# Patient Record
Sex: FEMALE | Race: WHITE | NOT HISPANIC OR LATINO | Employment: OTHER | URBAN - METROPOLITAN AREA
[De-identification: names, ages, dates, MRNs, and addresses within clinical notes are randomized per-mention and may not be internally consistent; named-entity substitution may affect disease eponyms.]

---

## 2017-04-19 DIAGNOSIS — L40.9 PSORIASIS OF SCALP: ICD-10-CM

## 2017-04-19 RX ORDER — CLOBETASOL PROPIONATE 0.5 MG/ML
SOLUTION TOPICAL
Qty: 50 ML | Refills: 0 | Status: CANCELLED | OUTPATIENT
Start: 2017-04-19

## 2017-04-19 NOTE — TELEPHONE ENCOUNTER
Clobetasol      Last Written Prescription Date: 10/31/16  Last Fill Quantity: 50,  # refills: 0   Last Office Visit with [facility], [facility] or [facility] prescribing provider: 12/02/16

## 2017-04-21 NOTE — TELEPHONE ENCOUNTER
clobetasol (TEMOVATE) 0.05 % external solution 50 mL 0 10/31/2016  No   Sig: Apply sparingly to affected area twice daily for 14 days.  Do not apply to face.      Last Office Visit with [facility], [facility] or [facility] prescribing provider: Dr. Agbeh on 12-02-16 for WWE.  Routing refill request to provider for review/approval because:  No plan noted for Rx which is a requirement for RN to RF per [facility] protocols.   Will route to Dr. Agbeh for review & orders. Victorina Hobson RN, BAN

## 2017-05-01 ENCOUNTER — MYC REFILL (OUTPATIENT)
Dept: OBGYN | Facility: CLINIC | Age: 37
End: 2017-05-01

## 2017-05-01 DIAGNOSIS — L40.9 PSORIASIS OF SCALP: ICD-10-CM

## 2017-05-04 RX ORDER — CLOBETASOL PROPIONATE 0.5 MG/ML
SOLUTION TOPICAL
Qty: 50 ML | Refills: 0 | Status: SHIPPED | OUTPATIENT
Start: 2017-05-04 | End: 2018-10-01

## 2017-05-04 NOTE — TELEPHONE ENCOUNTER
Message from better.:  Original authorizing provider: Cephas Mawuena Agbeh, MD Allison Jeziorski would like a refill of the following medications:  clobetasol (TEMOVATE) 0.05 % external solution [Cephas Mawuena Agbeh, MD]    Preferred pharmacy: [id number] - [place], [place] - [address]    Comment:  I requested a refill and my pharmacy ([facility]) said it was denied and that I needed to go back to my prescriber. Please advise if you can submit refills with my pharmacy. Thanks, Kenzie Davila

## 2017-10-26 ENCOUNTER — ALLIED HEALTH/NURSE VISIT (OUTPATIENT)
Dept: NURSING | Facility: CLINIC | Age: 37
End: 2017-10-26
Payer: COMMERCIAL

## 2017-10-26 DIAGNOSIS — Z23 NEED FOR PROPHYLACTIC VACCINATION AND INOCULATION AGAINST INFLUENZA: Primary | ICD-10-CM

## 2017-10-26 PROCEDURE — 99207 ZZC NO CHARGE NURSE ONLY: CPT

## 2017-10-26 PROCEDURE — 90471 IMMUNIZATION ADMIN: CPT

## 2017-10-26 PROCEDURE — 90686 IIV4 VACC NO PRSV 0.5 ML IM: CPT

## 2017-10-26 NOTE — PROGRESS NOTES

## 2017-12-21 ENCOUNTER — OFFICE VISIT (OUTPATIENT)
Dept: OBGYN | Facility: CLINIC | Age: 37
End: 2017-12-21
Payer: COMMERCIAL

## 2017-12-21 VITALS
HEIGHT: 63 IN | WEIGHT: 127.4 LBS | DIASTOLIC BLOOD PRESSURE: 79 MMHG | OXYGEN SATURATION: 100 % | SYSTOLIC BLOOD PRESSURE: 131 MMHG | BODY MASS INDEX: 22.57 KG/M2 | HEART RATE: 76 BPM

## 2017-12-21 DIAGNOSIS — N92.0 MENORRHAGIA WITH REGULAR CYCLE: ICD-10-CM

## 2017-12-21 DIAGNOSIS — Z01.411 ENCOUNTER FOR GYNECOLOGICAL EXAMINATION WITH ABNORMAL FINDING: Primary | ICD-10-CM

## 2017-12-21 LAB
ALBUMIN SERPL-MCNC: 4.4 G/DL (ref 3.4–5)
ALP SERPL-CCNC: 44 U/L (ref 40–150)
ALT SERPL W P-5'-P-CCNC: 28 U/L (ref 0–50)
ANION GAP SERPL CALCULATED.3IONS-SCNC: 7 MMOL/L (ref 3–14)
AST SERPL W P-5'-P-CCNC: 23 U/L (ref 0–45)
BILIRUB SERPL-MCNC: 0.6 MG/DL (ref 0.2–1.3)
BUN SERPL-MCNC: 14 MG/DL (ref 7–30)
CALCIUM SERPL-MCNC: 8.9 MG/DL (ref 8.5–10.1)
CHLORIDE SERPL-SCNC: 105 MMOL/L (ref 94–109)
CO2 SERPL-SCNC: 27 MMOL/L (ref 20–32)
CREAT SERPL-MCNC: 0.71 MG/DL (ref 0.52–1.04)
ERYTHROCYTE [DISTWIDTH] IN BLOOD BY AUTOMATED COUNT: 12.3 % (ref 10–15)
GFR SERPL CREATININE-BSD FRML MDRD: >90 ML/MIN/1.7M2
GLUCOSE SERPL-MCNC: 90 MG/DL (ref 70–99)
HCT VFR BLD AUTO: 37.8 % (ref 35–47)
HGB BLD-MCNC: 12.6 G/DL (ref 11.7–15.7)
MCH RBC QN AUTO: 31 PG (ref 26.5–33)
MCHC RBC AUTO-ENTMCNC: 33.3 G/DL (ref 31.5–36.5)
MCV RBC AUTO: 93 FL (ref 78–100)
PLATELET # BLD AUTO: 270 10E9/L (ref 150–450)
POTASSIUM SERPL-SCNC: 3.8 MMOL/L (ref 3.4–5.3)
PROT SERPL-MCNC: 8.2 G/DL (ref 6.8–8.8)
RBC # BLD AUTO: 4.07 10E12/L (ref 3.8–5.2)
SODIUM SERPL-SCNC: 139 MMOL/L (ref 133–144)
TSH SERPL DL<=0.005 MIU/L-ACNC: 1.4 MU/L (ref 0.4–4)
WBC # BLD AUTO: 4 10E9/L (ref 4–11)

## 2017-12-21 PROCEDURE — 99395 PREV VISIT EST AGE 18-39: CPT | Performed by: OBSTETRICS & GYNECOLOGY

## 2017-12-21 PROCEDURE — 85027 COMPLETE CBC AUTOMATED: CPT | Performed by: OBSTETRICS & GYNECOLOGY

## 2017-12-21 PROCEDURE — 80053 COMPREHEN METABOLIC PANEL: CPT | Performed by: OBSTETRICS & GYNECOLOGY

## 2017-12-21 PROCEDURE — 84443 ASSAY THYROID STIM HORMONE: CPT | Performed by: OBSTETRICS & GYNECOLOGY

## 2017-12-21 PROCEDURE — 36415 COLL VENOUS BLD VENIPUNCTURE: CPT | Performed by: OBSTETRICS & GYNECOLOGY

## 2017-12-21 ASSESSMENT — PATIENT HEALTH QUESTIONNAIRE - PHQ9: SUM OF ALL RESPONSES TO PHQ QUESTIONS 1-9: 0

## 2017-12-21 NOTE — PROGRESS NOTES
"Kenzie is a 37 year old  here for annual exam.   Menses are heavy. Says she is done having children.   She had IUD insertion appointment but cancelled. Spouse not willing to do vasectomy.  Not interested in any hormonal therapy.   Interested in sterilization with endometrial ablation.  ROS: Ten point review of systems was reviewed and negative except the above.    Health Maintenance   Topic Date Due     PAP Q3 YR  10/12/2018     TETANUS IMMUNIZATION (SYSTEM ASSIGNED)  04/10/2024     INFLUENZA VACCINE (SYSTEM ASSIGNED)  Completed      Last pap:   Last Mammogram: none  Last Dexa: none  Last Colonoscopy: none  Lab Results   Component Value Date    CHOL 134 10/12/2015     Lab Results   Component Value Date    HDL 54 10/12/2015     Lab Results   Component Value Date    LDL 69 10/12/2015     Lab Results   Component Value Date    TRIG 53 10/12/2015     Lab Results   Component Value Date    CHOLHDLRATIO 2.5 10/12/2015         OBHX:      Past Surgical History:   Procedure Laterality Date     ARTHROSCOPY KNEE  10/26/2012    Procedure: ARTHROSCOPY KNEE;  Right knee arthroscopy with synovectomy;  Surgeon: Benjamin Farmer MD;  Location: [facility] OR      SECTION  , 2014    x 2     COLPOSCOPY CERVIX, LOOP ELECTRODE BIOPSY, COMBINED      no abnormal paps since     GYN SURGERY      LEEP       PMH: Her past medical, surgical, and obstetric histories were reviewed and are documented in their appropriate chart areas.    ALL/Meds: Her medication and allergy histories were reviewed and are documented in their appropriate chart areas.    SH/FMH: Her social and family history was reviewed and documented in its appropriate chart area.    PE: /79  Pulse 76  Ht 5' 3\" (1.6 m)  Wt 127 lb 6.4 oz (57.8 kg)  LMP 2017  SpO2 100%  Breastfeeding? No  BMI 22.57 kg/m2  Body mass index is 22.57 kg/(m^2).    General Appearance:  healthy, alert, active, no distress  Cardiovascular:  Regular rate and " Rhythm  Neck: Supple, no adenopathy and thyroid normal  Lungs:  Clear, without wheeze, rale or rhonchi  Breast: normal breast exam  Abdomen: Benign, Soft, flat, non-tender, No masses, organomegaly, No inguinal nodes and Bowel sounds normoactiveSoft, nontender.   Pelvic:       - Ext: Vulva and perineum are normal without lesion, mass or discharge        - Urethra: normal without discharge or scarring or hypermobility       - Urethral Meatus: normal appearance,        - Bladder: no tenderness, no masses       - Vagina: Normal mucosa, no discharge     rugated       - Cervix: nulliparous       - Uterus:Normal shape, position and consistencyfirm, nontender, nongravid uterus without CMT       - Adnexa: Normal without masses or tenderness       - Rectal: deferred    A/P:  Well Woman,     ICD-10-CM    1. Encounter for gynecological examination with abnormal finding Z01.411 CBC with platelets     Comprehensive metabolic panel     TSH with free T4 reflex   2. Menorrhagia with regular cycle N92.0 CBC with platelets     Comprehensive metabolic panel     TSH with free T4 reflex     Reviewed risks and benefits of medical versus surgical therapy.  Medical therapy reviewed included hormonal manipulation with OCP's, Patch, Ring, Depo, or IUD.   Reviewed endometrial ablation versus hysterectomy.  Discussed that endometrial ablation is minimally invasive compared to hysterectomy but may not be definitive. ACOG pamphlet provided on above topics including laparoscopic sterilization.     -  BC: condom     - Encouraged self-breast exam   - Encouraged low fat diet, regular exercise, and adequate calcium intake.    CEPHAS AGBEH, MD.

## 2017-12-21 NOTE — PATIENT INSTRUCTIONS
If you have any questions regarding your visit, Please contact your care team.    Women s Health CLINIC HOURS TELEPHONE NUMBER   Lamonts Agbeh, M.D.    France Santacruz- BRANDYN Prajapati - BRANDYN Vela -         Monday-[facility]  8:00 am - 5 pm  Tuesday- [facility]  8:00am- 5 pm  Wednesday- Off  Thursday- [facility]  8:00 am- 5 pm  Friday-[place]  8:00 am 5 pm [facility]  [address] [place] [address]  [phone] ask for [facility]    Imaging [phone]    [facility]  [address] [facility] [facility]  [place] [address] [phone]  Imaging [phone]     Urgent Care locations:    [facility] Saturday and Sunday   9 am - 5 pm    Monday-Friday   12 pm - 8 pm  Saturday and Sunday   9 am - 5 pm   [phone]       If you need a medication refill, please contact your pharmacy. Please allow 3 business days for your refill to be completed.  As always, Thank you for trusting us with your healthcare needs!

## 2018-10-01 DIAGNOSIS — L40.9 PSORIASIS OF SCALP: ICD-10-CM

## 2018-10-01 NOTE — LETTER
[address] [address]  Phone: [phone]    10/03/18    Kenzie Davila  [address] [address]      Dear Kenzie-    Regarding a recent refill request we received- one refill was sent to the pharmacy.  You will need to discuss this at your next office visit with Dr. Agbeh or with your primary care physician.  Please contact our office if you have any questions.    Sincerely,      OTILIO Main CNP

## 2018-10-03 RX ORDER — CLOBETASOL PROPIONATE 0.5 MG/ML
SOLUTION TOPICAL
Qty: 50 ML | Refills: 0 | Status: SHIPPED | OUTPATIENT
Start: 2018-10-03 | End: 2018-12-21

## 2018-10-03 RX ORDER — CLOBETASOL PROPIONATE 0.5 MG/ML
SOLUTION TOPICAL
Qty: 50 ML | Refills: 0 | Status: SHIPPED | OUTPATIENT
Start: 2018-10-03 | End: 2018-10-03

## 2018-10-03 NOTE — TELEPHONE ENCOUNTER
I have not seen this patient-I refilled for her in Dr. Agbeh's absence last year. I will refill once, but patient would need to discuss this at her next office visit with Dr. Agbeh or with primary care.  Clementina YAÑEZ CNP

## 2018-10-03 NOTE — TELEPHONE ENCOUNTER
Routing refill request to provider for review/approval because:  Drug not on the [facility] refill protocol   Pt reported not taking on 12/21/17    Katya Ayers RN

## 2018-10-15 ENCOUNTER — ALLIED HEALTH/NURSE VISIT (OUTPATIENT)
Dept: NURSING | Facility: CLINIC | Age: 38
End: 2018-10-15
Payer: COMMERCIAL

## 2018-10-15 DIAGNOSIS — Z23 NEED FOR PROPHYLACTIC VACCINATION AND INOCULATION AGAINST INFLUENZA: Primary | ICD-10-CM

## 2018-10-15 PROCEDURE — 99207 ZZC NO CHARGE NURSE ONLY: CPT

## 2018-10-15 PROCEDURE — 90686 IIV4 VACC NO PRSV 0.5 ML IM: CPT

## 2018-10-15 PROCEDURE — 90471 IMMUNIZATION ADMIN: CPT

## 2018-10-15 NOTE — PROGRESS NOTES

## 2018-10-15 NOTE — MR AVS SNAPSHOT
After Visit Summary   10/15/2018    Kenzie Daivla    MRN: [id number]           Patient Information     Date Of Birth          1980        Visit Information        Provider Department      10/15/2018 1:45 PM BE ANCILLARY [facility] [place]        Today's Diagnoses     Need for prophylactic vaccination and inoculation against influenza    -  1       Follow-ups after your visit        Your next 10 appointments already scheduled     Dec 21, 2018 10:00 AM CST   PHYSICAL with Cephas Mawuena Agbeh, MD   [facility]    [address] [facility]  [place] [id number] [phone]              Who to contact     If you have questions or need follow up information about today's clinic visit or your schedule please contact [facility] directly at [phone].  Normal or non-critical lab and imaging results will be communicated to you by MyChart, letter or phone within 4 business days after the clinic has received the results. If you do not hear from us within 7 days, please contact the clinic through MyChart or phone. If you have a critical or abnormal lab result, we will notify you by phone as soon as possible.  Submit refill requests through [employer] or call your pharmacy and they will forward the refill request to us. Please allow 3 business days for your refill to be completed.          Additional Information About Your Visit        MyChart Information     [employer] gives you secure access to your electronic health record. If you see a primary care provider, you can also send messages to your care team and make appointments. If you have questions, please call your primary care clinic.  If you do not have a primary care provider, please call [phone] and they will assist you.        Care EveryWhere ID     This is your Care EveryWhere ID. This could be used by other organizations to access your [facility] medical records  [id number]          Blood Pressure from Last 3 Encounters:   12/21/17 131/79   12/02/16 125/81   01/08/16 125/83    Weight from Last 3 Encounters:   12/21/17 127 lb 6.4 oz (57.8 kg)   12/02/16 131 lb (59.4 kg)   01/08/16 131 lb 9.6 oz (59.7 kg)              We Performed the Following     FLU VACCINE, SPLIT VIRUS, IM (QUADRIVALENT) [36565]- >3 YRS     Vaccine Administration, Initial [25829]        Primary Care Provider Office Phone # Fax #    Lamont Mawuena Agbeh, [phone]       [address] [place] [address]        Equal Access to Services     ANNE MARIE MARMOLEJO : Hadii stephon steen hadasho Soomaali, waaxda luqadaha, qaybta kaalmada adeegyada, lindsey dotson . So [facility] [phone].    ATENCIÓN: Si habla español, tiene a carver disposición servicios gratuitos de asistencia lingüística. Llame al [phone].    We comply with applicable federal civil rights laws and [place] laws. We do not discriminate on the basis of race, color, national origin, age, disability, sex, sexual orientation, or gender identity.            Thank you!     Thank you for choosing [facility]  for your care. Our goal is always to provide you with excellent care. Hearing back from our patients is one way we can continue to improve our services. Please take a few minutes to complete the written survey that you may receive in the mail after your visit with us. Thank you!             Your Updated Medication List - Protect others around you: Learn how to safely use, store and throw away your medicines at www.disposemymeds.org.          This list is accurate as of 10/15/18  2:28 PM.  Always use your most recent med list.                   Brand Name Dispense Instructions for use Diagnosis    clobetasol 0.05 % external solution    TEMOVATE    50 mL    APPLY SPARINGLY TO AFFECTED AREA TWICE DAILY FOR 14 DAYS. DO NOT APPLY TO FACE.    Psoriasis of scalp

## 2018-12-21 ENCOUNTER — OFFICE VISIT (OUTPATIENT)
Dept: OBGYN | Facility: CLINIC | Age: 38
End: 2018-12-21
Payer: COMMERCIAL

## 2018-12-21 VITALS
WEIGHT: 122 LBS | DIASTOLIC BLOOD PRESSURE: 82 MMHG | SYSTOLIC BLOOD PRESSURE: 134 MMHG | BODY MASS INDEX: 21.62 KG/M2 | HEART RATE: 72 BPM | TEMPERATURE: 97.2 F | HEIGHT: 63 IN

## 2018-12-21 DIAGNOSIS — L40.9 PSORIASIS OF SCALP: ICD-10-CM

## 2018-12-21 DIAGNOSIS — Z01.411 ENCOUNTER FOR GYNECOLOGICAL EXAMINATION WITH ABNORMAL FINDING: Primary | ICD-10-CM

## 2018-12-21 LAB
ALBUMIN SERPL-MCNC: 4.1 G/DL (ref 3.4–5)
ALP SERPL-CCNC: 51 U/L (ref 40–150)
ALT SERPL W P-5'-P-CCNC: 36 U/L (ref 0–50)
ANION GAP SERPL CALCULATED.3IONS-SCNC: 8 MMOL/L (ref 3–14)
AST SERPL W P-5'-P-CCNC: 26 U/L (ref 0–45)
BILIRUB SERPL-MCNC: 0.3 MG/DL (ref 0.2–1.3)
BUN SERPL-MCNC: 12 MG/DL (ref 7–30)
CALCIUM SERPL-MCNC: 9 MG/DL (ref 8.5–10.1)
CHLORIDE SERPL-SCNC: 104 MMOL/L (ref 94–109)
CO2 SERPL-SCNC: 26 MMOL/L (ref 20–32)
CREAT SERPL-MCNC: 0.7 MG/DL (ref 0.52–1.04)
ERYTHROCYTE [DISTWIDTH] IN BLOOD BY AUTOMATED COUNT: 12.3 % (ref 10–15)
GFR SERPL CREATININE-BSD FRML MDRD: >90 ML/MIN/{1.73_M2}
GLUCOSE SERPL-MCNC: 83 MG/DL (ref 70–99)
HCT VFR BLD AUTO: 38.3 % (ref 35–47)
HGB BLD-MCNC: 13 G/DL (ref 11.7–15.7)
MCH RBC QN AUTO: 31.6 PG (ref 26.5–33)
MCHC RBC AUTO-ENTMCNC: 33.9 G/DL (ref 31.5–36.5)
MCV RBC AUTO: 93 FL (ref 78–100)
PLATELET # BLD AUTO: 228 10E9/L (ref 150–450)
POTASSIUM SERPL-SCNC: 3.9 MMOL/L (ref 3.4–5.3)
PROT SERPL-MCNC: 8.1 G/DL (ref 6.8–8.8)
RBC # BLD AUTO: 4.11 10E12/L (ref 3.8–5.2)
SODIUM SERPL-SCNC: 138 MMOL/L (ref 133–144)
TSH SERPL DL<=0.005 MIU/L-ACNC: 2.01 MU/L (ref 0.4–4)
WBC # BLD AUTO: 4.6 10E9/L (ref 4–11)

## 2018-12-21 PROCEDURE — 36415 COLL VENOUS BLD VENIPUNCTURE: CPT | Performed by: OBSTETRICS & GYNECOLOGY

## 2018-12-21 PROCEDURE — 80053 COMPREHEN METABOLIC PANEL: CPT | Performed by: OBSTETRICS & GYNECOLOGY

## 2018-12-21 PROCEDURE — 99395 PREV VISIT EST AGE 18-39: CPT | Performed by: OBSTETRICS & GYNECOLOGY

## 2018-12-21 PROCEDURE — 87624 HPV HI-RISK TYP POOLED RSLT: CPT | Performed by: OBSTETRICS & GYNECOLOGY

## 2018-12-21 PROCEDURE — 84443 ASSAY THYROID STIM HORMONE: CPT | Performed by: OBSTETRICS & GYNECOLOGY

## 2018-12-21 PROCEDURE — G0145 SCR C/V CYTO,THINLAYER,RESCR: HCPCS | Performed by: OBSTETRICS & GYNECOLOGY

## 2018-12-21 PROCEDURE — 85027 COMPLETE CBC AUTOMATED: CPT | Performed by: OBSTETRICS & GYNECOLOGY

## 2018-12-21 RX ORDER — CLOBETASOL PROPIONATE 0.5 MG/ML
SOLUTION TOPICAL
Qty: 50 ML | Refills: 3 | Status: SHIPPED | OUTPATIENT
Start: 2018-12-21 | End: 2019-12-20

## 2018-12-21 ASSESSMENT — MIFFLIN-ST. JEOR: SCORE: 1202.52

## 2018-12-21 NOTE — PROGRESS NOTES
Kenzie is a 38 year old  here for annual exam.   She wants to reschedule her IUD insertion in 3 weeks.  Requesting medication refills.    ROS: Ten point review of systems was reviewed and negative except the above.    Health Maintenance   Topic Date Due     PAP Q3 YR  10/15/2018     PHQ-2 Q1 YR  2019     DTAP/TDAP/TD IMMUNIZATION (2 - Td) 04/10/2024     ZOSTER IMMUNIZATION (1 of 2) 05/15/2030     HIV SCREEN (SYSTEM ASSIGNED)  Completed     INFLUENZA VACCINE  Completed     IPV IMMUNIZATION  Aged Out     MENINGITIS IMMUNIZATION  Aged Out      Last pap: 1015  Last Mammogram: none  Last Dexa: none  Last Colonoscopy: none  Lab Results   Component Value Date    CHOL 134 10/12/2015     Lab Results   Component Value Date    HDL 54 10/12/2015     Lab Results   Component Value Date    LDL 69 10/12/2015     Lab Results   Component Value Date    TRIG 53 10/12/2015     Lab Results   Component Value Date    CHOLHDLRATIO 2.5 10/12/2015         OBHX:    Obstetric History       T2      L2     SAB2   TAB0   Ectopic0   Multiple0   Live Births2       # Outcome Date GA Lbr Endy/2nd Weight Sex Delivery Anes PTL Lv   4 Term 05/15/14 38w3d 03:00 2.75 kg (6 lb 1 oz) M CS-LTranv Spinal  SHAILESH      Name: Stephan      Apgar1:  9                Apgar5: 9   3 SAB 13 7w6d       ND   2 Term 12 39w0d  2.92 kg (6 lb 7 oz) F -SEC   SHAILESH      Name: Edith   1 SAB 11 6w0d    SAB   ND          Past Surgical History:   Procedure Laterality Date     ARTHROSCOPY KNEE  10/26/2012    Procedure: ARTHROSCOPY KNEE;  Right knee arthroscopy with synovectomy;  Surgeon: Benjamin Farmer MD;  Location: [facility] OR      SECTION  , 2014    x 2     COLPOSCOPY CERVIX, LOOP ELECTRODE BIOPSY, COMBINED      no abnormal paps since     GYN SURGERY      LEEP       PMH: Her past medical, surgical, and obstetric histories were reviewed and are documented in their appropriate chart areas.    ALL/Meds: Her medication  "and allergy histories were reviewed and are documented in their appropriate chart areas.    SH/FMH: Her social and family history was reviewed and documented in its appropriate chart area.    PE: /82   Pulse 72   Temp 97.2  F (36.2  C) (Tympanic)   Ht 1.6 m (5' 3\")   Wt 55.3 kg (122 lb)   LMP 12/07/2018 (Exact Date)   Breastfeeding? No   BMI 21.61 kg/m    Body mass index is 21.61 kg/m .    General Appearance:  healthy, alert, active, no distress  Cardiovascular:  Regular rate and Rhythm  Neck: Supple, no adenopathy and thyroid normal  Lungs:  Clear, without wheeze, rale or rhonchi  Breast: normal breast exam  Abdomen: Benign, Soft, flat, non-tender, No masses, organomegaly, No inguinal nodes and Bowel sounds normoactiveSoft, nontender.   Pelvic:       - Ext: Vulva and perineum are normal without lesion, mass or discharge        - Urethra: normal without discharge or scarring or hypermobility       - Urethral Meatus: normal appearance,        - Bladder: no tenderness, no masses       - Vagina: Normal mucosa, no discharge     rugated       - Cervix: nulliparous       - Uterus:Normal shape, position and consistencyfirm, nontender, nongravid uterus without CMT       - Adnexa: Normal without masses or tenderness       - Rectal: deferred    A/P:  Well Woman,     ICD-10-CM    1. Encounter for gynecological examination with abnormal finding Z01.411 CBC with platelets     Comprehensive metabolic panel     TSH with free T4 reflex     Pap imaged thin layer screen with HPV - recommended age 30 - 65 years (select HPV order below)     HPV High Risk Types DNA Cervical   2. Psoriasis of scalp L40.9 clobetasol (TEMOVATE) 0.05 % external solution      -  BC: IUD insertion in 3 weeks.      - Encouraged self-breast exam   - Encouraged low fat diet, regular exercise, and adequate calcium intake.    CEPHAS AGBEH, MD.       "

## 2018-12-27 LAB
COPATH REPORT: NORMAL
PAP: NORMAL

## 2018-12-28 LAB
FINAL DIAGNOSIS: NORMAL
HPV HR 12 DNA CVX QL NAA+PROBE: NEGATIVE
HPV16 DNA SPEC QL NAA+PROBE: NEGATIVE
HPV18 DNA SPEC QL NAA+PROBE: NEGATIVE
SPECIMEN DESCRIPTION: NORMAL
SPECIMEN SOURCE CVX/VAG CYTO: NORMAL

## 2019-02-18 ENCOUNTER — OFFICE VISIT (OUTPATIENT)
Dept: OBGYN | Facility: CLINIC | Age: 39
End: 2019-02-18
Payer: COMMERCIAL

## 2019-02-18 VITALS
DIASTOLIC BLOOD PRESSURE: 87 MMHG | OXYGEN SATURATION: 99 % | TEMPERATURE: 97.9 F | WEIGHT: 118.8 LBS | HEIGHT: 63 IN | HEART RATE: 67 BPM | BODY MASS INDEX: 21.05 KG/M2 | SYSTOLIC BLOOD PRESSURE: 138 MMHG

## 2019-02-18 DIAGNOSIS — Z30.430 ENCOUNTER FOR IUD INSERTION: Primary | ICD-10-CM

## 2019-02-18 PROCEDURE — 58300 INSERT INTRAUTERINE DEVICE: CPT | Performed by: OBSTETRICS & GYNECOLOGY

## 2019-02-18 ASSESSMENT — MIFFLIN-ST. JEOR: SCORE: 1188

## 2019-02-18 ASSESSMENT — PAIN SCALES - GENERAL: PAINLEVEL: NO PAIN (0)

## 2019-02-18 NOTE — PROGRESS NOTES
Kenzie Davila is a 38 year old  who presents today requesting placement of an Mirena iud.    The patient meets and is agreeable to the following conditions:  She is not interested in conception in the near future.   She currently is in a stable, monogamous relationship.   There is no previous history of pelvic inflammatory disease.   There is no previous history of ectopic pregnancy.   She is willing to check monthly for the IUD string.    There is no history of unresolved abnormal uterine bleeding.   There is no history of an unresolved abnormal PAP smear.     We discussed risks, benefits, and alternatives including but not limited to:   Possibility of pregnancy and ectopic pregnancy.  Possibility of pelvic inflammatory disease, particularly with new partners.  Risk of uterine perforation or IUD expulsion.  Possibility of difficult removal.  Spotting or heavy bleeding.  Cramping, pain or infection during or after insertion.    The patient was given patient information on the IUD and the patient education brochure from the .  The patient has given consent to proceed with placement of the IUD.  She wishes to proceed.  All questions answered.      PROCEDURE:  Type of IUD: Mirena    The patient has taken 600-800mg of Ibuprofen prior to the procedure.   She is placed in a dorsal lithotomy potion and a pelvic exam is performed to determine the position of the uterus.  The cervix is identified and cleaned with betadine.  A single tooth tenaculum is applied to the anterior lip of the cervix for stabilization.   The uterus sounded to 8.0 cm. (Target sound depth is 6.5 cm to 8.5 cm.)  The IUD insertion tube is prepared to manufacturers recommendations and inserted into the uterus under sterile conditions in the usual fashion.  The IUD string is then cut to 3.5 cm.    The patient tolerated this procedure without immediate complication.  The patient is to return or call immediately for any unexplained  fever, abdominal or pelvic pain, excessive bleeding, possibility of pregnancy, foul-smelling discharge, sense that the IUD has been expelled.  All questions were answered.    ICD-10-CM    1. Encounter for IUD insertion Z30.430 INSERTION INTRAUTERINE DEVICE     HC LEVONORGESTREL IU 52MG 5 YR       Return to clinic in 1 month for IUD check    CEPHAS AGBEH, MD.

## 2019-03-18 ENCOUNTER — OFFICE VISIT (OUTPATIENT)
Dept: OBGYN | Facility: CLINIC | Age: 39
End: 2019-03-18
Payer: COMMERCIAL

## 2019-03-18 VITALS
DIASTOLIC BLOOD PRESSURE: 78 MMHG | HEART RATE: 74 BPM | SYSTOLIC BLOOD PRESSURE: 124 MMHG | OXYGEN SATURATION: 99 % | BODY MASS INDEX: 21.51 KG/M2 | WEIGHT: 121.4 LBS

## 2019-03-18 DIAGNOSIS — Z30.431 IUD CHECK UP: Primary | ICD-10-CM

## 2019-03-18 PROCEDURE — 99214 OFFICE O/P EST MOD 30 MIN: CPT | Performed by: OBSTETRICS & GYNECOLOGY

## 2019-03-18 NOTE — PROGRESS NOTES
Kenzie is a 38 year old  here for IUD surveillance.  She had the MIRENA IUD inserted one month ago.    She has not had any problems with the IUD.  No unusual  Bleeding.Light period.  No cramping.      PMH: Her past medical, surgical, and obstetric histories were reviewed and are documented in their appropriate chart areas.    ALL/Meds: Her medication and allergy histories were reviewed and are documented in their appropriate chart areas.    ROS:4 point ROS including Respiratory, CV, GI and , other than that noted in the HPI,  is negative     EXAM:  /78   Pulse 74   Wt 55.1 kg (121 lb 6.4 oz)   LMP 2019 (Approximate)   SpO2 99%   Breastfeeding? No   BMI 21.51 kg/m    General:  WNWD female, NAD  Alert  Oriented x 3  Gait:  Normal  Skin:  Normal skin turgor  HEENT:  NC/AT, EOMI  Abdomen:  Non-tender, non-distended.  Vulva: No external lesions, normal hair distribution, no adenopathy  BUS:  Normal, no masses noted  Urethra:  No hypermobility seen  Urethral meatus:  No masses noted  Vagina: Moist, pink, no abnormal discharge, well rugated, no lesions  Cervix: Smooth, pink, no visible lesions, no CMT, IUD strings seen  Uterus: Normal size, anteverted, non-tender, mobile  Ovaries: No mass, non-tender, mobile  Perianal:  No masses noted  Extremities:  No clubbing, no cyanosis and no edema.        ASSESSMENT:    ICD-10-CM    1. IUD check up Z30.431         PLAN:  She does not have any apparent contraindications for continued use of the IUD.        25 minutes was spent face to face with the patient today discussing her history, diagnosis, and follow-up plan as noted above.  Over 50% of the visit was spent in counseling and coordination of care.    Total Visit Time: 30 minutes.    CEPHAS AGBEH, MD.

## 2019-03-18 NOTE — PATIENT INSTRUCTIONS
If you have any questions regarding your visit, Please contact your care team.    [employer]: [phone]      [facility] CLINIC HOURS TELEPHONE NUMBER   Cephas Agbeh, M.D.    Victorina Vela -         Monday-[facility] [place]  8:00 am - 5 pm  Tuesday- [facility]  8:00am- 5 pm  Wednesday- Off  Thursday- [facility] [place]  8:00 am- 5 pm  Friday-[place]  8:00 am 5 pm [facility]  [address]  [place], [place] [address] [phone] ask for [facility]    Imaging [phone]    [facility] [place]  [address] [address]  [place] [address] [phone]  Imaging [phone]     Urgent Care locations:    [facility] Saturday and Sunday   9 am - 5 pm    Monday-Friday   12 pm - 8 pm  Saturday and Sunday   9 am - 5 pm   [phone]       If you need a medication refill, please contact your pharmacy. Please allow 3 business days for your refill to be completed.  As always, Thank you for trusting us with your healthcare needs!

## 2019-11-13 ENCOUNTER — OFFICE VISIT (OUTPATIENT)
Dept: FAMILY MEDICINE | Facility: CLINIC | Age: 39
End: 2019-11-13
Payer: COMMERCIAL

## 2019-11-13 VITALS
OXYGEN SATURATION: 100 % | RESPIRATION RATE: 16 BRPM | TEMPERATURE: 97.9 F | DIASTOLIC BLOOD PRESSURE: 80 MMHG | BODY MASS INDEX: 20.37 KG/M2 | SYSTOLIC BLOOD PRESSURE: 116 MMHG | WEIGHT: 115 LBS | HEART RATE: 86 BPM

## 2019-11-13 DIAGNOSIS — Z01.818 PRE-OPERATIVE GENERAL PHYSICAL EXAMINATION: Primary | ICD-10-CM

## 2019-11-13 DIAGNOSIS — G56.03 BILATERAL CARPAL TUNNEL SYNDROME: ICD-10-CM

## 2019-11-13 LAB — HCG UR QL: NEGATIVE

## 2019-11-13 PROCEDURE — 99204 OFFICE O/P NEW MOD 45 MIN: CPT | Performed by: PHYSICIAN ASSISTANT

## 2019-11-13 PROCEDURE — 81025 URINE PREGNANCY TEST: CPT | Performed by: PHYSICIAN ASSISTANT

## 2019-11-13 NOTE — PROGRESS NOTES
[facility]  [address]  [place] [address]  [phone]  Dept: [phone]    PRE-OP EVALUATION:  Today's date: 2019    Kenzie Davila (: 1980) presents for pre-operative evaluation assessment as requested by Dr. Ross.  She requires evaluation and anesthesia risk assessment prior to undergoing surgery/procedure for treatment of right wrist  (and left wrist on 19)    Proposed Surgery/ Procedure: Carpel Tunnel Release  Date of Surgery/ Procedure: 2019  Time of Surgery/ Procedure: 9:00  Hospital/Surgical Facility: [facility]  Fax number for surgical facility:   Primary Physician: Agbeh, Cephas Mawuena  Type of Anesthesia Anticipated: Local    Patient has a Health Care Directive or Living Will:  NO    1. NO - Do you have a history of heart attack, stroke, stent, bypass or surgery on an artery in the head, neck, heart or legs?  2. NO - Do you ever have any pain or discomfort in your chest?  3. NO - Do you have a history of  Heart Failure?  4. NO - Are you troubled by shortness of breath when: walking on the level, up a slight hill or at night?  5. NO - Do you currently have a cold, bronchitis or other respiratory infection?  6. NO - Do you have a cough, shortness of breath or wheezing?  7. NO - Do you sometimes get pains in the calves of your legs when you walk?  8. NO - Do you or anyone in your family have previous history of blood clots?  9. NO - Do you or does anyone in your family have a serious bleeding problem such as prolonged bleeding following surgeries or cuts?  10. NO - Have you ever had problems with anemia or been told to take iron pills?  11. NO - Have you had any abnormal blood loss such as black, tarry or bloody stools, or abnormal vaginal bleeding?  12. NO - Have you ever had a blood transfusion?  13. NO - Have you or any of your relatives ever had problems with anesthesia?  14. NO - Do you have sleep apnea, excessive snoring or  daytime drowsiness?  15. NO - Do you have any prosthetic heart valves?  16. NO - Do you have prosthetic joints?  17. NO - Is there any chance that you may be pregnant?      HPI:     HPI related to upcoming procedure: right and left wrist carpal tunnel syndrome. (right to be operated on first)      See problem list for active medical problems.  Problems all longstanding and stable, except as noted/documented.  See ROS for pertinent symptoms related to these conditions.      MEDICAL HISTORY:     Patient Active Problem List    Diagnosis Date Noted     Encounter for supervision of other normal pregnancy 2014     Priority: Medium     Diagnosis updated by automated process. Provider to review and confirm.       Pregnancy with history of miscarriage 2013     Priority: Medium     History of  delivery, currently pregnant 2013     Priority: Medium     Primary LST C/S was performed on 2012 due to arrest of descent, LOP, and asynclitic presentation - infant weighed 6 lbs 6 oz.    Repeat C/S on 14 @ 0730 AM         Synovitis of knee 2012     Priority: Medium     Chondromalacia patellae 2012     Priority: Medium     Hyperlipidemia with target LDL less than 160 10/17/2012     Priority: Medium     Diagnosis updated by automated process. Provider to review and confirm.       Knee joint effusion 2012     Priority: Medium     Moderate cervical dysplasia 2011     Priority: Medium     ASC-US +(hr) HPV 2006. [place]--ALTAGRACIA 2-3  2006 LEEP showing ALTAGRACIA 2.  All normal paps since. Yearly paps indicated.  14: Pap - NIL, Neg HPV. Plan cotest in 1 year. If Neg then cotest in 3 years, then to routine screening.   10/2/15 pap NIL/neg HPV. Plan: cotest 3 yrs  2018 Pap: NIL/neg HPV. Routine screening        Past Medical History:   Diagnosis Date     Acute medial meniscus tear of right knee      Degenerative joint disease      S/P LEEP of cervix 2006    ALTAGRACIA 2     Past Surgical  History:   Procedure Laterality Date     ARTHROSCOPY KNEE  10/26/2012    Procedure: ARTHROSCOPY KNEE;  Right knee arthroscopy with synovectomy;  Surgeon: Benjamin aFrmer MD;  Location: [facility] OR      SECTION  , 2014    x 2     COLPOSCOPY CERVIX, LOOP ELECTRODE BIOPSY, COMBINED      no abnormal paps since     GYN SURGERY      LEEP     Current Outpatient Medications   Medication Sig Dispense Refill     clobetasol (TEMOVATE) 0.05 % external solution APPLY SPARINGLY TO AFFECTED AREA TWICE DAILY FOR 14 DAYS. DO NOT APPLY TO FACE. (Patient not taking: Reported on 2019) 50 mL 3     OTC products: None, except as noted above    Allergies   Allergen Reactions     Penicillins Hives     Azithromycin Rash     reddness on chest and neck      Clindamycin Hcl Hives and Rash      Latex Allergy: NO    Social History     Tobacco Use     Smoking status: Never Smoker     Smokeless tobacco: Never Used   Substance Use Topics     Alcohol use: No     History   Drug Use No       REVIEW OF SYSTEMS:   Constitutional, neuro, ENT, endocrine, pulmonary, cardiac, gastrointestinal, genitourinary, musculoskeletal, integument and psychiatric systems are negative, except as otherwise noted.    EXAM:   /80   Pulse 86   Temp 97.9  F (36.6  C) (Tympanic)   Resp 16   Wt 52.2 kg (115 lb)   SpO2 100%   BMI 20.37 kg/m      GENERAL APPEARANCE: healthy, alert and no distress     EYES: EOMI, PERRL     HENT: ear canals and TM's normal and nose and mouth without ulcers or lesions     NECK: no adenopathy, no asymmetry, masses, or scars and thyroid normal to palpation     RESP: lungs clear to auscultation - no rales, rhonchi or wheezes     CV: regular rates and rhythm, normal S1 S2, no S3 or S4 and no murmur, click or rub     ABDOMEN:  soft, nontender, no HSM or masses and bowel sounds normal     MS: extremities normal- no gross deformities noted, no evidence of inflammation in joints, FROM in all extremities.     SKIN: no  suspicious lesions or rashes     NEURO: Normal strength and tone, sensory exam grossly normal, mentation intact and speech normal     PSYCH: mentation appears normal. and affect normal/bright     LYMPHATICS: No cervical adenopathy    DIAGNOSTICS:   No labs or EKG required for low risk surgery (cataract, skin procedure, breast biopsy, etc)    Recent Labs   Lab Test 12/21/18  1025 12/21/17  1039   HGB 13.0 12.6    270    139   POTASSIUM 3.9 3.8   CR 0.70 0.71        IMPRESSION:       The proposed surgical procedure is considered INTERMEDIATE risk.    REVISED CARDIAC RISK INDEX  The patient has the following serious cardiovascular risks for perioperative complications such as (MI, PE, VFib and 3  AV Block):  No serious cardiac risks  INTERPRETATION: 0 risks: Class I (very low risk - 0.4% complication rate)    The patient has the following additional risks for perioperative complications:  No identified additional risks    Kenzie was seen today for pre-op exam.    Diagnoses and all orders for this visit:    Pre-operative general physical examination    Bilateral carpal tunnel syndrome          RECOMMENDATIONS:             APPROVAL GIVEN to proceed with proposed procedure, without further diagnostic evaluation       Signed Electronically by: Seferino Martinez PA-C    Copy of this evaluation report is provided to requesting physician.    [facility] Preop Guidelines    Revised Cardiac Risk Index

## 2019-11-18 ENCOUNTER — APPOINTMENT (OUTPATIENT)
Age: 39
Setting detail: DERMATOLOGY
End: 2019-11-22

## 2019-11-18 ENCOUNTER — MYC MEDICAL ADVICE (OUTPATIENT)
Dept: OBGYN | Facility: CLINIC | Age: 39
End: 2019-11-18

## 2019-11-18 VITALS — HEIGHT: 63 IN | WEIGHT: 115 LBS | RESPIRATION RATE: 16 BRPM

## 2019-11-18 DIAGNOSIS — L91.8 OTHER HYPERTROPHIC DISORDERS OF THE SKIN: ICD-10-CM

## 2019-11-18 DIAGNOSIS — L72.0 EPIDERMAL CYST: ICD-10-CM

## 2019-11-18 DIAGNOSIS — D22 MELANOCYTIC NEVI: ICD-10-CM

## 2019-11-18 PROBLEM — D22.62 MELANOCYTIC NEVI OF LEFT UPPER LIMB, INCLUDING SHOULDER: Status: ACTIVE | Noted: 2019-11-18

## 2019-11-18 PROBLEM — D23.71 OTHER BENIGN NEOPLASM OF SKIN OF RIGHT LOWER LIMB, INCLUDING HIP: Status: ACTIVE | Noted: 2019-11-18

## 2019-11-18 PROBLEM — D48.5 NEOPLASM OF UNCERTAIN BEHAVIOR OF SKIN: Status: ACTIVE | Noted: 2019-11-18

## 2019-11-18 PROBLEM — D23.39 OTHER BENIGN NEOPLASM OF SKIN OF OTHER PARTS OF FACE: Status: ACTIVE | Noted: 2019-11-18

## 2019-11-18 PROCEDURE — 99203 OFFICE O/P NEW LOW 30 MIN: CPT | Mod: 25

## 2019-11-18 PROCEDURE — OTHER PATHOLOGY BILLING: OTHER

## 2019-11-18 PROCEDURE — OTHER BIOPSY BY SHAVE METHOD: OTHER

## 2019-11-18 PROCEDURE — 11103 TANGNTL BX SKIN EA SEP/ADDL: CPT

## 2019-11-18 PROCEDURE — OTHER COUNSELING: OTHER

## 2019-11-18 PROCEDURE — 11102 TANGNTL BX SKIN SINGLE LES: CPT

## 2019-11-18 PROCEDURE — 88305 TISSUE EXAM BY PATHOLOGIST: CPT

## 2019-11-18 ASSESSMENT — LOCATION DETAILED DESCRIPTION DERM
LOCATION DETAILED: RIGHT ANTERIOR AXILLA
LOCATION DETAILED: LEFT INFERIOR MEDIAL LOWER BACK
LOCATION DETAILED: RIGHT SUPERIOR CENTRAL MALAR CHEEK
LOCATION DETAILED: LEFT SUPERIOR CENTRAL MALAR CHEEK
LOCATION DETAILED: LEFT POSTERIOR SHOULDER

## 2019-11-18 ASSESSMENT — LOCATION ZONE DERM
LOCATION ZONE: TRUNK
LOCATION ZONE: ARM
LOCATION ZONE: FACE
LOCATION ZONE: AXILLAE

## 2019-11-18 ASSESSMENT — LOCATION SIMPLE DESCRIPTION DERM
LOCATION SIMPLE: RIGHT CHEEK
LOCATION SIMPLE: RIGHT ANTERIOR AXILLA
LOCATION SIMPLE: LEFT CHEEK
LOCATION SIMPLE: LEFT SHOULDER
LOCATION SIMPLE: LEFT LOWER BACK

## 2019-11-18 NOTE — PROCEDURE: PATHOLOGY BILLING
Immunohistochemistry ([id number] and [id number]) billing is not performed here. Please use the Immunohistochemistry Stain Billing plan to accomplish this. Immunohistochemistry ([id number] and [id number]) billing is not performed here. Please use the Immunohistochemistry Stain Billing plan to accomplish this.

## 2019-11-18 NOTE — TELEPHONE ENCOUNTER
No documentation of the Hep B vaccine in pt's chart.  Checked [facility] and there is no documentation of it there either.

## 2019-11-18 NOTE — PROCEDURE: COUNSELING
Detail Level: Detailed
Patient Specific Counseling (Will Not Stick From Patient To Patient): She may consider removal.
Patient Specific Counseling (Will Not Stick From Patient To Patient): She may consider cosmetic removal. Arrive 15 min early for numbing cream

## 2019-11-18 NOTE — PROCEDURE: BIOPSY BY SHAVE METHOD
Render Post-Care Instructions In Note?: no
Anesthesia Volume In Cc (Will Not Render If 0): 0.5
Biopsy Type: H and E
Curettage Text: The wound bed was treated with curettage after the biopsy was performed.
Detail Level: Detailed
Electrodesiccation Text: The wound bed was treated with electrodesiccation after the biopsy was performed.
Hemostasis: Drysol
Size Of Lesion In Cm: 0
Silver Nitrate Text: The wound bed was treated with silver nitrate after the biopsy was performed.
Notification Instructions: Patient will be notified of biopsy results. However, patient instructed to call the office if not contacted within 2 weeks.
Post-Care Instructions: I reviewed with the patient in detail post-care instructions. Patient is to keep the biopsy site dry overnight, and then apply bacitracin twice daily until healed. Patient may apply hydrogen peroxide soaks to remove any crusting.
Billing Type: Client Bill
Dressing: bandage
Anesthesia Type: 1% lidocaine with epinephrine
Consent: Written consent was obtained and risks were reviewed including but not limited to scarring, infection, bleeding, scabbing, incomplete removal, nerve damage and allergy to anesthesia.
Depth Of Biopsy: dermis
Biopsy Method: Dermablade
Wound Care: Petrolatum
Electrodesiccation And Curettage Text: The wound bed was treated with electrodesiccation and curettage after the biopsy was performed.
Type Of Destruction Used: Curettage
Cryotherapy Text: The wound bed was treated with cryotherapy after the biopsy was performed.
Was A Bandage Applied: Yes

## 2019-12-05 ENCOUNTER — TELEPHONE (OUTPATIENT)
Dept: OBGYN | Facility: CLINIC | Age: 39
End: 2019-12-05

## 2019-12-05 NOTE — TELEPHONE ENCOUNTER
Informed pt by  she can get Hepatitis B vaccine with an appt for Nurse onlt visit or Family practise. Dr. Agbeh usually ref. Hep B to one of them.  Sharon Desouza MA

## 2019-12-05 NOTE — TELEPHONE ENCOUNTER
Reason for Call:  Other call back    Detailed comments: patient is calling confused to message about hep B, wonder why patient couldn't come to appt to see provider and have RN give shot same time same day. Please call to advise. Thank you.    Phone Number Patient can be reached at: Home number on file [phone] (home)    Best Time:     Can we leave a detailed message on this number? YES    Call taken on 12/5/2019 at 1:55 PM by Althea Gonzalez

## 2019-12-05 NOTE — TELEPHONE ENCOUNTER
Also, please clarify date of patient's next appointment. Patient references 12/23 but appointment listed for 12/20 on Epic.    Rhonda Monteiro RN on 12/5/2019 at 2:18 PM

## 2019-12-05 NOTE — TELEPHONE ENCOUNTER
Returned call to patient. She is scheduled to see the ancillary nurse to start Hep B series after her appointment with Dr. Agbeh on 12/20/19.    Verbalized appointment date and time.    Patient pleased.    Cristhian Solis CMA

## 2019-12-20 ENCOUNTER — OFFICE VISIT (OUTPATIENT)
Dept: OBGYN | Facility: CLINIC | Age: 39
End: 2019-12-20
Payer: COMMERCIAL

## 2019-12-20 VITALS
BODY MASS INDEX: 20.19 KG/M2 | WEIGHT: 114 LBS | RESPIRATION RATE: 16 BRPM | OXYGEN SATURATION: 100 % | HEART RATE: 75 BPM | SYSTOLIC BLOOD PRESSURE: 143 MMHG | TEMPERATURE: 98.1 F | DIASTOLIC BLOOD PRESSURE: 79 MMHG

## 2019-12-20 DIAGNOSIS — Z23 IMMUNIZATION DUE: ICD-10-CM

## 2019-12-20 DIAGNOSIS — L40.9 PSORIASIS OF SCALP: ICD-10-CM

## 2019-12-20 DIAGNOSIS — Z01.419 ENCOUNTER FOR GYNECOLOGICAL EXAMINATION WITHOUT ABNORMAL FINDING: Primary | ICD-10-CM

## 2019-12-20 LAB
ALBUMIN SERPL-MCNC: 4.7 G/DL (ref 3.4–5)
ALP SERPL-CCNC: 48 U/L (ref 40–150)
ALT SERPL W P-5'-P-CCNC: 28 U/L (ref 0–50)
ANION GAP SERPL CALCULATED.3IONS-SCNC: 6 MMOL/L (ref 3–14)
AST SERPL W P-5'-P-CCNC: 16 U/L (ref 0–45)
BILIRUB SERPL-MCNC: 0.6 MG/DL (ref 0.2–1.3)
BUN SERPL-MCNC: 13 MG/DL (ref 7–30)
CALCIUM SERPL-MCNC: 9.5 MG/DL (ref 8.5–10.1)
CHLORIDE SERPL-SCNC: 105 MMOL/L (ref 94–109)
CO2 SERPL-SCNC: 27 MMOL/L (ref 20–32)
CREAT SERPL-MCNC: 0.73 MG/DL (ref 0.52–1.04)
ERYTHROCYTE [DISTWIDTH] IN BLOOD BY AUTOMATED COUNT: 12.4 % (ref 10–15)
GFR SERPL CREATININE-BSD FRML MDRD: >90 ML/MIN/{1.73_M2}
GLUCOSE SERPL-MCNC: 88 MG/DL (ref 70–99)
HCT VFR BLD AUTO: 41.8 % (ref 35–47)
HGB BLD-MCNC: 14.2 G/DL (ref 11.7–15.7)
MCH RBC QN AUTO: 31.7 PG (ref 26.5–33)
MCHC RBC AUTO-ENTMCNC: 34 G/DL (ref 31.5–36.5)
MCV RBC AUTO: 93 FL (ref 78–100)
PLATELET # BLD AUTO: 231 10E9/L (ref 150–450)
POTASSIUM SERPL-SCNC: 3.9 MMOL/L (ref 3.4–5.3)
PROT SERPL-MCNC: 8.2 G/DL (ref 6.8–8.8)
RBC # BLD AUTO: 4.48 10E12/L (ref 3.8–5.2)
SODIUM SERPL-SCNC: 138 MMOL/L (ref 133–144)
TSH SERPL DL<=0.005 MIU/L-ACNC: 2.02 MU/L (ref 0.4–4)
WBC # BLD AUTO: 4.1 10E9/L (ref 4–11)

## 2019-12-20 PROCEDURE — 90746 HEPB VACCINE 3 DOSE ADULT IM: CPT | Performed by: OBSTETRICS & GYNECOLOGY

## 2019-12-20 PROCEDURE — 99395 PREV VISIT EST AGE 18-39: CPT | Mod: 25 | Performed by: OBSTETRICS & GYNECOLOGY

## 2019-12-20 PROCEDURE — 90471 IMMUNIZATION ADMIN: CPT | Performed by: OBSTETRICS & GYNECOLOGY

## 2019-12-20 PROCEDURE — 36415 COLL VENOUS BLD VENIPUNCTURE: CPT | Performed by: OBSTETRICS & GYNECOLOGY

## 2019-12-20 PROCEDURE — 80053 COMPREHEN METABOLIC PANEL: CPT | Performed by: OBSTETRICS & GYNECOLOGY

## 2019-12-20 PROCEDURE — 85027 COMPLETE CBC AUTOMATED: CPT | Performed by: OBSTETRICS & GYNECOLOGY

## 2019-12-20 PROCEDURE — 84443 ASSAY THYROID STIM HORMONE: CPT | Performed by: OBSTETRICS & GYNECOLOGY

## 2019-12-20 RX ORDER — CLOBETASOL PROPIONATE 0.5 MG/ML
SOLUTION TOPICAL
Qty: 50 ML | Refills: 3 | Status: SHIPPED | OUTPATIENT
Start: 2019-12-20 | End: 2022-05-23

## 2019-12-20 NOTE — NURSING NOTE
Prior to immunization administration, verified patients identity using patient s name and date of birth. Please see Immunization Activity for additional information.     Screening Questionnaire for Adult Immunization    Are you sick today?   No   Do you have allergies to medications, food, a vaccine component or latex?   YES   Have you ever had a serious reaction after receiving a vaccination?   No   Do you have a long-term health problem with heart, lung, kidney, or metabolic disease (e.g., diabetes), asthma, a blood disorder, no spleen, complement component deficiency, a cochlear implant, or a spinal fluid leak?  Are you on long-term aspirin therapy?   No   Do you have cancer, leukemia, HIV/AIDS, or any other immune system problem?   No   Do you have a parent, brother, or sister with an immune system problem?   No   In the past 3 months, have you taken medications that affect  your immune system, such as prednisone, other steroids, or anticancer drugs; drugs for the treatment of rheumatoid arthritis, Crohn s disease, or psoriasis; or have you had radiation treatments?   No   Have you had a seizure, or a brain or other nervous system problem?   No   During the past year, have you received a transfusion of blood or blood    products, or been given immune (gamma) globulin or antiviral drug?   No   For women: Are you pregnant or is there a chance you could become       pregnant during the next month?   No   Have you received any vaccinations in the past 4 weeks?   No     Immunization questionnaire answers were all negative.        Per orders of Dr. Agbeh, injection of Hep B given by Yissel Guadalupe. Patient instructed to remain in clinic for 15 minutes afterwards, and to report any adverse reaction to me immediately.       Screening performed by Yissel Guadalupe on 12/20/2019 at 10:36 AM.

## 2019-12-20 NOTE — PROGRESS NOTES
Kenzie is a 39 year old  here for annual exam.   Has MIRENA IUD for contraception, with minimal spotting.    ROS: Ten point review of systems was reviewed and negative except the above.    Health Maintenance   Topic Date Due     PREVENTIVE CARE VISIT  2019     HPV  2023     PAP  2023     DTAP/TDAP/TD IMMUNIZATION (4 - Td) 04/10/2024     HIV SCREENING  Completed     PHQ-2  Completed     INFLUENZA VACCINE  Completed     IPV IMMUNIZATION  Aged Out     MENINGITIS IMMUNIZATION  Aged Out      Last pap:   Last Mammogram: none  Last Dexa: none  Last Colonoscopy: none  Lab Results   Component Value Date    CHOL 134 10/12/2015     Lab Results   Component Value Date    HDL 54 10/12/2015     Lab Results   Component Value Date    LDL 69 10/12/2015     Lab Results   Component Value Date    TRIG 53 10/12/2015     Lab Results   Component Value Date    CHOLHDLRATIO 2.5 10/12/2015         OBHX:      Past Surgical History:   Procedure Laterality Date     ARTHROSCOPY KNEE  10/26/2012    Procedure: ARTHROSCOPY KNEE;  Right knee arthroscopy with synovectomy;  Surgeon: Benjamin Farmer MD;  Location: [facility] OR      SECTION  , 2014    x 2     COLPOSCOPY CERVIX, LOOP ELECTRODE BIOPSY, COMBINED      no abnormal paps since     GYN SURGERY      LEEP       PMH: Her past medical, surgical, and obstetric histories were reviewed and are documented in their appropriate chart areas.    ALL/Meds: Her medication and allergy histories were reviewed and are documented in their appropriate chart areas.    SH/FMH: Her social and family history was reviewed and documented in its appropriate chart area.    PE: BP (!) 143/79   Pulse 75   Temp 98.1  F (36.7  C) (Tympanic)   Resp 16   Wt 51.7 kg (114 lb)   SpO2 100%   BMI 20.19 kg/m    Body mass index is 20.19 kg/m .    General Appearance:  healthy, alert, active, no distress  Cardiovascular:  Regular rate and Rhythm  Neck: Supple, no adenopathy and thyroid  normal  Lungs:  Clear, without wheeze, rale or rhonchi  Breast: normal breast exam  Abdomen: Benign, Soft, flat, non-tender, No masses, organomegaly, No inguinal nodes and Bowel sounds normoactiveSoft, nontender.   Pelvic:       - Ext: Vulva and perineum are normal without lesion, mass or discharge        - Urethra: normal without discharge or scarring or hypermobility       - Urethral Meatus: normal appearance,        - Bladder: no tenderness, no masses       - Vagina: Normal mucosa, no discharge     rugated       - Cervix: nulliparous. IUD strings visible and appropriate.       - Uterus:Normal shape, position and consistencyfirm, nontender, nongravid uterus without CMT       - Adnexa: Normal without masses or tenderness       - Rectal: deferred    A/P:  Well Woman,     ICD-10-CM    1. Encounter for gynecological examination without abnormal finding Z01.419 CBC with platelets     Comprehensive metabolic panel     TSH with free T4 reflex   2. Psoriasis of scalp L40.9 clobetasol (TEMOVATE) 0.05 % external solution   3. Immunization due Z23 HEPATITIS B VACCINE, ADULT, IM     ADMIN 1st VACCINE      -  BC: IUD      - Encouraged self-breast exam   - Encouraged low fat diet, regular exercise, and adequate calcium intake.    CEPHAS AGBEH, MD.

## 2020-01-21 ENCOUNTER — ALLIED HEALTH/NURSE VISIT (OUTPATIENT)
Dept: NURSING | Facility: CLINIC | Age: 40
End: 2020-01-21
Payer: COMMERCIAL

## 2020-01-21 DIAGNOSIS — Z23 NEED FOR VACCINATION: Primary | ICD-10-CM

## 2020-01-21 PROCEDURE — 90471 IMMUNIZATION ADMIN: CPT

## 2020-01-21 PROCEDURE — 90746 HEPB VACCINE 3 DOSE ADULT IM: CPT

## 2020-01-21 PROCEDURE — 99207 ZZC NO CHARGE NURSE ONLY: CPT

## 2020-01-21 NOTE — PROGRESS NOTES
Prior to immunization administration, verified patients identity using patient s name and date of birth. Please see Immunization Activity for additional information.     Screening Questionnaire for Adult Immunization    Are you sick today?   No   Do you have allergies to medications, food, a vaccine component or latex?   No   Have you ever had a serious reaction after receiving a vaccination?   No   Do you have a long-term health problem with heart, lung, kidney, or metabolic disease (e.g., diabetes), asthma, a blood disorder, no spleen, complement component deficiency, a cochlear implant, or a spinal fluid leak?  Are you on long-term aspirin therapy?   No   Do you have cancer, leukemia, HIV/AIDS, or any other immune system problem?   No   Do you have a parent, brother, or sister with an immune system problem?   No   In the past 3 months, have you taken medications that affect  your immune system, such as prednisone, other steroids, or anticancer drugs; drugs for the treatment of rheumatoid arthritis, Crohn s disease, or psoriasis; or have you had radiation treatments?   No   Have you had a seizure, or a brain or other nervous system problem?   No   During the past year, have you received a transfusion of blood or blood    products, or been given immune (gamma) globulin or antiviral drug?   No   For women: Are you pregnant or is there a chance you could become       pregnant during the next month?   No   Have you received any vaccinations in the past 4 weeks?   No     Immunization questionnaire answers were all negative.        Injection of Hep B given by Davida Williamson MA. Patient instructed to remain in clinic for 15 minutes afterwards, and to report any adverse reaction to me immediately.       Screening performed by Davida Williamson MA on 1/21/2020 at 2:53 PM.    Davida Williamson MA

## 2020-09-05 NOTE — MR AVS SNAPSHOT
After Visit Summary   12/21/2017    Kenzie Davila    MRN: [id number]           Patient Information     Date Of Birth          1980        Visit Information        Provider Department      12/21/2017 10:00 AM Agbeh, Cephas Mawuena, MD [facility]        Care Instructions                                                           If you have any questions regarding your visit, Please contact your care team.    [facility] CLINIC HOURS TELEPHONE NUMBER   Cephas Agbeh, M.D.    [place] - WILLY Santacruz- BRANDYN Prajapati - RN    [place] -         Monday-[facility]  8:00 am - 5 pm  Tuesday- [facility]  8:00am- 5 pm  Wednesday- Off  Thursday- [facility]  8:00 am- 5 pm  Friday-Adrian  8:00 am 5 pm [facility]  [address] [place] [address] [phone] ask for [facility]    Imaging [phone]    [facility]  [id number] [place] [id number] [phone]  Imaging [phone]     Urgent Care locations:    [facility] Saturday and Sunday   9 am - 5 pm    Monday-Friday   12 pm - 8 pm  Saturday and Sunday   9 am - 5 pm   [phone]       If you need a medication refill, please contact your pharmacy. Please allow 3 business days for your refill to be completed.  As always, Thank you for trusting us with your healthcare needs!                  Follow-ups after your visit        Who to contact     If you have questions or need follow up information about today's clinic visit or your schedule please contact [facility] directly at [phone].  Normal or non-critical lab and imaging results will be communicated to you by MyChart, letter or phone within 4 business days after the clinic has received the results. If you do not hear from us within 7 days, please contact the clinic through MyChart or phone. If you have a critical or abnormal lab result, we  "will notify you by phone as soon as possible.  Submit refill requests through [employer] or call your pharmacy and they will forward the refill request to us. Please allow 3 business days for your refill to be completed.          Additional Information About Your Visit        [employer] Information     [employer] gives you secure access to your electronic health record. If you see a primary care provider, you can also send messages to your care team and make appointments. If you have questions, please call your primary care clinic.  If you do not have a primary care provider, please call [phone] and they will assist you.        Care EveryWhere ID     This is your Care EveryWhere ID. This could be used by other organizations to access your [facility] medical records  [id number]        Your Vitals Were     Pulse Height Last Period Pulse Oximetry Breastfeeding? BMI (Body Mass Index)    76 5' 3\" (1.6 m) 12/09/2017 100% No 22.57 kg/m2       Blood Pressure from Last 3 Encounters:   12/21/17 131/79   12/02/16 125/81   01/08/16 125/83    Weight from Last 3 Encounters:   12/21/17 127 lb 6.4 oz (57.8 kg)   12/02/16 131 lb (59.4 kg)   01/08/16 131 lb 9.6 oz (59.7 kg)              Today, you had the following     No orders found for display       Primary Care Provider Office Phone # Fax #    Lamont Mawuena Agbeh, [phone]       [address] [place] [address]        Equal Access to Services     [facility] AH: Hadii aad ku hadasho Soomaali, waaxda luqadaha, qaybta kaalmada adeegyada, waxay milton dotson . So [facility] [phone].    ATENCIÓN: Si habla español, tiene a carver disposición servicios gratuitos de asistencia lingüística. Llame al [phone].    We comply with applicable federal civil rights laws and [place] laws. We do not discriminate on the basis of race, color, national origin, age, disability, sex, sexual orientation, or gender identity.            Thank you!     Thank " you for choosing [facility]  for your care. Our goal is always to provide you with excellent care. Hearing back from our patients is one way we can continue to improve our services. Please take a few minutes to complete the written survey that you may receive in the mail after your visit with us. Thank you!             Your Updated Medication List - Protect others around you: Learn how to safely use, store and throw away your medicines at www.disposemymeds.org.          This list is accurate as of: 12/21/17 10:11 AM.  Always use your most recent med list.                   Brand Name Dispense Instructions for use Diagnosis    clobetasol 0.05 % external solution    TEMOVATE    50 mL    Apply sparingly to affected area twice daily for 14 days.  Do not apply to face.    Psoriasis of scalp          Never

## 2020-09-27 ENCOUNTER — MYC MEDICAL ADVICE (OUTPATIENT)
Dept: OBGYN | Facility: CLINIC | Age: 40
End: 2020-09-27

## 2020-09-28 NOTE — TELEPHONE ENCOUNTER
Pt last seen 12/20/2019 for annual exam    Pt had hep B vaccine on 12/20/2019 and 1/21/2020. Pt states she was to return for 3rd vaccine in June.    RN routing to provider if pt can be seen now for vaccine or if labs/anything is needed prior.    Rhonda Monteiro RN on 9/28/2020 at 8:01 AM

## 2020-09-28 NOTE — TELEPHONE ENCOUNTER
Agbeh, Cephas Mawuena, MD  You 2 minutes ago (8:51 AM)       PLEASE GIVE THE THIRD shot.   CEPHAS AGBEH, MD.      Routing comment      Rhonda Monteiro RN on 9/28/2020 at 8:54 AM

## 2020-12-28 ENCOUNTER — OFFICE VISIT (OUTPATIENT)
Dept: OBGYN | Facility: CLINIC | Age: 40
End: 2020-12-28
Payer: COMMERCIAL

## 2020-12-28 VITALS
BODY MASS INDEX: 19.7 KG/M2 | WEIGHT: 111.2 LBS | HEIGHT: 63 IN | SYSTOLIC BLOOD PRESSURE: 120 MMHG | DIASTOLIC BLOOD PRESSURE: 82 MMHG | HEART RATE: 81 BPM

## 2020-12-28 DIAGNOSIS — Z01.419 ENCOUNTER FOR GYNECOLOGICAL EXAMINATION WITHOUT ABNORMAL FINDING: Primary | ICD-10-CM

## 2020-12-28 DIAGNOSIS — Z23 ENCOUNTER FOR IMMUNIZATION: ICD-10-CM

## 2020-12-28 LAB
ALBUMIN SERPL-MCNC: 4.4 G/DL (ref 3.4–5)
ALP SERPL-CCNC: 47 U/L (ref 40–150)
ALT SERPL W P-5'-P-CCNC: 26 U/L (ref 0–50)
ANION GAP SERPL CALCULATED.3IONS-SCNC: 1 MMOL/L (ref 3–14)
AST SERPL W P-5'-P-CCNC: 17 U/L (ref 0–45)
BILIRUB SERPL-MCNC: 0.6 MG/DL (ref 0.2–1.3)
BUN SERPL-MCNC: 14 MG/DL (ref 7–30)
CALCIUM SERPL-MCNC: 9.3 MG/DL (ref 8.5–10.1)
CHLORIDE SERPL-SCNC: 107 MMOL/L (ref 94–109)
CHOLEST SERPL-MCNC: 153 MG/DL
CO2 SERPL-SCNC: 30 MMOL/L (ref 20–32)
CREAT SERPL-MCNC: 0.77 MG/DL (ref 0.52–1.04)
ERYTHROCYTE [DISTWIDTH] IN BLOOD BY AUTOMATED COUNT: 12.2 % (ref 10–15)
GFR SERPL CREATININE-BSD FRML MDRD: >90 ML/MIN/{1.73_M2}
GLUCOSE SERPL-MCNC: 87 MG/DL (ref 70–99)
HCT VFR BLD AUTO: 39 % (ref 35–47)
HDLC SERPL-MCNC: 63 MG/DL
HGB BLD-MCNC: 12.9 G/DL (ref 11.7–15.7)
LDLC SERPL CALC-MCNC: 78 MG/DL
MCH RBC QN AUTO: 31.4 PG (ref 26.5–33)
MCHC RBC AUTO-ENTMCNC: 33.1 G/DL (ref 31.5–36.5)
MCV RBC AUTO: 95 FL (ref 78–100)
NONHDLC SERPL-MCNC: 90 MG/DL
PLATELET # BLD AUTO: 247 10E9/L (ref 150–450)
POTASSIUM SERPL-SCNC: 3.9 MMOL/L (ref 3.4–5.3)
PROT SERPL-MCNC: 7.8 G/DL (ref 6.8–8.8)
RBC # BLD AUTO: 4.11 10E12/L (ref 3.8–5.2)
SODIUM SERPL-SCNC: 138 MMOL/L (ref 133–144)
TRIGL SERPL-MCNC: 58 MG/DL
TSH SERPL DL<=0.005 MIU/L-ACNC: 2.03 MU/L (ref 0.4–4)
WBC # BLD AUTO: 3.5 10E9/L (ref 4–11)

## 2020-12-28 PROCEDURE — 36415 COLL VENOUS BLD VENIPUNCTURE: CPT | Performed by: OBSTETRICS & GYNECOLOGY

## 2020-12-28 PROCEDURE — 90471 IMMUNIZATION ADMIN: CPT | Performed by: OBSTETRICS & GYNECOLOGY

## 2020-12-28 PROCEDURE — 85027 COMPLETE CBC AUTOMATED: CPT | Performed by: OBSTETRICS & GYNECOLOGY

## 2020-12-28 PROCEDURE — 80053 COMPREHEN METABOLIC PANEL: CPT | Performed by: OBSTETRICS & GYNECOLOGY

## 2020-12-28 PROCEDURE — 84443 ASSAY THYROID STIM HORMONE: CPT | Performed by: OBSTETRICS & GYNECOLOGY

## 2020-12-28 PROCEDURE — 90746 HEPB VACCINE 3 DOSE ADULT IM: CPT | Performed by: OBSTETRICS & GYNECOLOGY

## 2020-12-28 PROCEDURE — 80061 LIPID PANEL: CPT | Performed by: OBSTETRICS & GYNECOLOGY

## 2020-12-28 PROCEDURE — 99396 PREV VISIT EST AGE 40-64: CPT | Mod: 25 | Performed by: OBSTETRICS & GYNECOLOGY

## 2020-12-28 ASSESSMENT — MIFFLIN-ST. JEOR: SCORE: 1143.4

## 2020-12-28 NOTE — PROGRESS NOTES
Kenzie is a 40 year old  here for annual exam.   No Menses with MIRENA IUD placed 2 years ago.  Due for Hep B Vaccine, last dose today.    ROS: Ten point review of systems was reviewed and negative except the above.    Health Maintenance   Topic Date Due     HEPATITIS C SCREENING  05/15/1998     PREVENTIVE CARE VISIT  2020     HPV TEST  2023     PAP  2023     DTAP/TDAP/TD IMMUNIZATION (4 - Td) 04/10/2024     HIV SCREENING  Completed     PHQ-2  Completed     INFLUENZA VACCINE  Completed     Pneumococcal Vaccine: Pediatrics (0 to 5 Years) and At-Risk Patients (6 to 64 Years)  Aged Out     IPV IMMUNIZATION  Aged Out     MENINGITIS IMMUNIZATION  Aged Out     HEPATITIS B IMMUNIZATION  Aged Out      Last pap:   Last Mammogram: none  Last Dexa: none  Last Colonoscopy: none  Lab Results   Component Value Date    CHOL 134 10/12/2015     Lab Results   Component Value Date    HDL 54 10/12/2015     Lab Results   Component Value Date    LDL 69 10/12/2015     Lab Results   Component Value Date    TRIG 53 10/12/2015     Lab Results   Component Value Date    CHOLHDLRATIO 2.5 10/12/2015         OBHX:    OB History    Para Term  AB Living   4 2 2 0 2 2   SAB TAB Ectopic Multiple Live Births   2 0 0 0 2      # Outcome Date GA Lbr Endy/2nd Weight Sex Delivery Anes PTL Lv   4 Term 05/15/14 38w3d 03:00 2.75 kg (6 lb 1 oz) M CS-LTranv Spinal  SHAILESH      Birth Comments: repeat in labor      Name: Stephan      Apgar1: 9  Apgar5: 9   3 SAB 13 7w6d       ND   2 Term 12 39w0d  2.92 kg (6 lb 7 oz) F -SEC   SHAILESH      Birth Comments: induced       Name: Edith   1 SAB 11 6w0d    SAB   ND      Birth Comments: spontaneous     Past Surgical History:   Procedure Laterality Date     ARTHROSCOPY KNEE  10/26/2012    Procedure: ARTHROSCOPY KNEE;  Right knee arthroscopy with synovectomy;  Surgeon: Benjamin Farmer MD;  Location: [facility] OR      SECTION  , 2014    x 2      "COLPOSCOPY CERVIX, LOOP ELECTRODE BIOPSY, COMBINED  2005    no abnormal paps since     GYN SURGERY      LEEP         PMH: Her past medical, surgical, and obstetric histories were reviewed and are documented in their appropriate chart areas.    ALL/Meds: Her medication and allergy histories were reviewed and are documented in their appropriate chart areas.    SH/FMH: Her social and family history was reviewed and documented in its appropriate chart area.    PE: /82   Pulse 81   Ht 1.6 m (5' 2.99\")   Wt 50.4 kg (111 lb 3.2 oz)   Breastfeeding No   BMI 19.70 kg/m    Body mass index is 19.7 kg/m .    General Appearance:  healthy, alert, active, no distress  Cardiovascular:  Regular rate and Rhythm  Neck: Supple, no adenopathy and thyroid normal  Lungs:  Clear, without wheeze, rale or rhonchi  Breast: normal breast exam  Abdomen: Benign, Soft, flat, non-tender, No masses, organomegaly, No inguinal nodes and Bowel sounds normoactiveSoft, nontender.   Pelvic:       - Ext: Vulva and perineum are normal without lesion, mass or discharge        - Urethra: normal without discharge or scarring or hypermobility       - Urethral Meatus: normal appearance,        - Bladder: no tenderness, no masses       - Vagina: Normal mucosa, no discharge     rugated       - Cervix: nulliparous. IUD strings visible and appropriate .       - Uterus:Normal shape, position and consistencyfirm, nontender, nongravid uterus without CMT       - Adnexa: Normal without masses or tenderness       - Rectal: deferred    A/P:  Well Woman,     ICD-10-CM    1. Encounter for gynecological examination without abnormal finding  Z01.419 Comprehensive metabolic panel     Lipid panel reflex to direct LDL Fasting     TSH with free T4 reflex     CBC with platelets     *MA Screening Digital Bilateral   2. Encounter for immunization  Z23 HEPATITIS B VACCINE, ADULT, IM            -  BC: IUD     - Encouraged self-breast exam   - Encouraged low fat diet, " regular exercise, and adequate calcium intake.    Prior to immunization administration, verified patients identity using patient s name and date of birth. Please see Immunization Activity for additional information.     Screening Questionnaire for Adult Immunization    Are you sick today?   No   Do you have allergies to medications, food, a vaccine component or latex?   No   Have you ever had a serious reaction after receiving a vaccination?   No   Do you have a long-term health problem with heart, lung, kidney, or metabolic disease (e.g., diabetes), asthma, a blood disorder, no spleen, complement component deficiency, a cochlear implant, or a spinal fluid leak?  Are you on long-term aspirin therapy?   No   Do you have cancer, leukemia, HIV/AIDS, or any other immune system problem?   No   Do you have a parent, brother, or sister with an immune system problem?   No   In the past 3 months, have you taken medications that affect  your immune system, such as prednisone, other steroids, or anticancer drugs; drugs for the treatment of rheumatoid arthritis, Crohn s disease, or psoriasis; or have you had radiation treatments?   No   Have you had a seizure, or a brain or other nervous system problem?   No   During the past year, have you received a transfusion of blood or blood    products, or been given immune (gamma) globulin or antiviral drug?   No   For women: Are you pregnant or is there a chance you could become       pregnant during the next month?   No   Have you received any vaccinations in the past 4 weeks?   No     Immunization questionnaire answers were all negative.        Per orders of Dr. Agbeh, injection of Hep B given by Sharon Desouza MA. Patient instructed to remain in clinic for 15 minutes afterwards, and to report any adverse reaction to me immediately.       Screening performed by Sharon Desouza MA on 12/28/2020 at 9:05 AM.    CEPHAS AGBEH, MD.

## 2021-01-18 ENCOUNTER — ANCILLARY PROCEDURE (OUTPATIENT)
Dept: MAMMOGRAPHY | Facility: CLINIC | Age: 41
End: 2021-01-18
Attending: OBSTETRICS & GYNECOLOGY
Payer: COMMERCIAL

## 2021-01-18 DIAGNOSIS — Z01.419 ENCOUNTER FOR GYNECOLOGICAL EXAMINATION WITHOUT ABNORMAL FINDING: ICD-10-CM

## 2021-01-18 DIAGNOSIS — Z12.31 VISIT FOR SCREENING MAMMOGRAM: ICD-10-CM

## 2021-01-18 PROCEDURE — 77067 SCR MAMMO BI INCL CAD: CPT | Mod: TC | Performed by: RADIOLOGY

## 2021-09-01 NOTE — PROCEDURE: PATHOLOGY BILLING
Rendering Text In Billing: The biopsy specimens were grossed and processed into slides. Medical Necessity Statement: Based on my medical judgement, Mohs surgery is the most appropriate treatment for this cancer compared to other treatments.

## 2021-10-02 ENCOUNTER — HEALTH MAINTENANCE LETTER (OUTPATIENT)
Age: 41
End: 2021-10-02

## 2021-11-18 ENCOUNTER — TRANSFERRED RECORDS (OUTPATIENT)
Dept: HEALTH INFORMATION MANAGEMENT | Facility: CLINIC | Age: 41
End: 2021-11-18
Payer: COMMERCIAL

## 2021-12-03 ENCOUNTER — OFFICE VISIT (OUTPATIENT)
Dept: OBGYN | Facility: CLINIC | Age: 41
End: 2021-12-03
Payer: COMMERCIAL

## 2021-12-03 VITALS
DIASTOLIC BLOOD PRESSURE: 84 MMHG | SYSTOLIC BLOOD PRESSURE: 137 MMHG | WEIGHT: 112.4 LBS | BODY MASS INDEX: 20.68 KG/M2 | HEIGHT: 62 IN | HEART RATE: 77 BPM

## 2021-12-03 DIAGNOSIS — Z01.419 ENCOUNTER FOR GYNECOLOGICAL EXAMINATION WITHOUT ABNORMAL FINDING: Primary | ICD-10-CM

## 2021-12-03 PROCEDURE — 87624 HPV HI-RISK TYP POOLED RSLT: CPT | Performed by: OBSTETRICS & GYNECOLOGY

## 2021-12-03 PROCEDURE — 99396 PREV VISIT EST AGE 40-64: CPT | Performed by: OBSTETRICS & GYNECOLOGY

## 2021-12-03 PROCEDURE — G0145 SCR C/V CYTO,THINLAYER,RESCR: HCPCS | Performed by: OBSTETRICS & GYNECOLOGY

## 2021-12-03 ASSESSMENT — MIFFLIN-ST. JEOR: SCORE: 1132.9

## 2021-12-03 NOTE — PROGRESS NOTES
32w1d.    Tired.  No HA, visual changes, N/V  NST today  Will schedule for weekly BPPs  RTC 1 week  Eduardo Arthur MD      PROCEDURE:   NST  DIAGNOSIS:  Twin gestation  NST IS:  Reactive (2 accl > 15 BPM in 20 min., each lasting approx. 15 seconds) for twin a  NST Baseline Rate 135  Variability:  moderate  Accelerations:Present  Variable Decelerations:No  Other Decelerations:No  Contractions: no  Further Comments:  Twin A NST is reactive.  Unable to keep Twin B on the monitor for a continuous strip.    Eduardo Arthur MD         Kenize is a 41 year old  here for annual exam.   No menses with MIRENA IUD x 3 years.    ROS: Ten point review of systems was reviewed and negative except the above.    Health Maintenance   Topic Date Due     ADVANCE CARE PLANNING  Never done     HEPATITIS C SCREENING  Never done     PHQ-2  2021     PREVENTIVE CARE VISIT  2021     HPV TEST  2023     PAP  2023     DTAP/TDAP/TD IMMUNIZATION (4 - Td or Tdap) 04/10/2024     HIV SCREENING  Completed     INFLUENZA VACCINE  Completed     COVID-19 Vaccine  Completed     Pneumococcal Vaccine: Pediatrics (0 to 5 Years) and At-Risk Patients (6 to 64 Years)  Aged Out     IPV IMMUNIZATION  Aged Out     MENINGITIS IMMUNIZATION  Aged Out     HEPATITIS B IMMUNIZATION  Aged Out      Last pap:   Last Mammogram:   Last Dexa: none  Last Colonoscopy: none  Lab Results   Component Value Date    CHOL 153 2020     Lab Results   Component Value Date    HDL 63 2020     Lab Results   Component Value Date    LDL 78 2020     Lab Results   Component Value Date    TRIG 58 2020     Lab Results   Component Value Date    CHOLHDLRATIO 2.5 10/12/2015         OBHX:     OB History    Para Term  AB Living   4 2 2 0 2 2   SAB IAB Ectopic Multiple Live Births   2 0 0 0 2      # Outcome Date GA Lbr Endy/2nd Weight Sex Delivery Anes PTL Lv   4 Term 05/15/14 38w3d 03:00 2.75 kg (6 lb 1 oz) M CS-LTranv Spinal  SHAILESH      Birth Comments: repeat in labor      Name: Stephan      Apgar1: 9  Apgar5: 9   3 SAB 13 7w6d       ND   2 Term 12 39w0d  2.92 kg (6 lb 7 oz) F -SEC   SHAILESH      Birth Comments: induced       Name: Edith   1 SAB 11 6w0d    SAB   ND      Birth Comments: spontaneous         Past Surgical History:   Procedure Laterality Date     ARTHROSCOPY KNEE  10/26/2012    Procedure: ARTHROSCOPY KNEE;  Right knee arthroscopy with synovectomy;  Surgeon: Benjamin Farmer MD;  Location:  OR      " SECTION  , 2014    x 2     COLPOSCOPY CERVIX, LOOP ELECTRODE BIOPSY, COMBINED      no abnormal paps since     GYN SURGERY      LEEP       PMH: Her past medical, surgical, and obstetric histories were reviewed and are documented in their appropriate chart areas.    ALL/Meds: Her medication and allergy histories were reviewed and are documented in their appropriate chart areas.    SH/FMH: Her social and family history was reviewed and documented in its appropriate chart area.    PE: /84   Pulse 77   Ht 1.582 m (5' 2.3\")   Wt 51 kg (112 lb 6.4 oz)   Breastfeeding No   BMI 20.36 kg/m    Body mass index is 20.36 kg/m .    General Appearance:  healthy, alert, active, no distress  Cardiovascular:  Regular rate and Rhythm  Neck: Supple, no adenopathy and thyroid normal  Lungs:  Clear, without wheeze, rale or rhonchi  Breast: normal breast exam  Abdomen: Benign, Soft, flat, non-tender, No masses, organomegaly, No inguinal nodes and Bowel sounds normoactiveSoft, nontender.   Pelvic:       - Ext: Vulva and perineum are normal without lesion, mass or discharge        - Urethra: normal without discharge or scarring or hypermobility       - Urethral Meatus: normal appearance,        - Bladder: no tenderness, no masses       - Vagina: Normal mucosa, no discharge     rugated       - Cervix: nulliparous. IUD strings visible and appropriate.       - Uterus:Normal shape, position and consistencyfirm, nontender, nongravid uterus without CMT       - Adnexa: Normal without masses or tenderness       - Rectal: deferred  NURSE FOR EXAM  A/P:  Well Woman,     ICD-10-CM    1. Encounter for gynecological examination without abnormal finding  Z01.419 CBC with Platelets & Differential     Comprehensive metabolic panel     TSH with free T4 reflex     Pap imaged thin layer screen with HPV - recommended age 30 - 65 years (select HPV order below)        - Encouraged self-breast exam   - Encouraged low fat diet, regular " exercise, and adequate calcium intake.    CEPHAS AGBEH, MD.

## 2021-12-03 NOTE — PATIENT INSTRUCTIONS
If you have any questions regarding your visit, Please contact your care team.  [facility]: [phone]  [facility] CLINIC HOURS TELEPHONE NUMBER   Cephas Agbeh, M.D. Becky-RN Kylie-RN Heidi-RN Deanna-MA Angela-  Bri-         Monday-[place]    8:00a.m-4:45 p.m    Tuesday--[place]     8:00a.m-4:45 p.m.    Thursday-[place]    8:00a.m-4:45 p.m.    Friday-[place]    8:00a.m-4:45 p.m    [facility]   [address]   [place] [address]   [phone]   Fax [phone]   Smgnwtr-[phone]     [facility] Labor and Delivery   [address] [place] [address] [place] [address]   [phone]    [facility]  [address] [facility] [address]  [phone]  Hatpquf-[phone]   Urgent Care locations:    [facility] Monday-Friday  10 am - 8 pm  Saturday and Sunday   9 am - 5 pm   Monday-Friday   10 am- 8 pm  Saturday and Sunday  9 am - 5 pm    [phone]   If you need a medication refill, please contact your pharmacy. Please allow 3 business days for your refill to be completed.  As always, Thank you for trusting us with your healthcare needs!

## 2021-12-07 ENCOUNTER — MYC MEDICAL ADVICE (OUTPATIENT)
Dept: OBGYN | Facility: CLINIC | Age: 41
End: 2021-12-07
Payer: COMMERCIAL

## 2021-12-07 LAB
BKR LAB AP GYN ADEQUACY: NORMAL
BKR LAB AP GYN INTERPRETATION: NORMAL
BKR LAB AP HPV REFLEX: NORMAL
BKR LAB AP PREVIOUS ABNORMAL: NORMAL
PATH REPORT.COMMENTS IMP SPEC: NORMAL
PATH REPORT.COMMENTS IMP SPEC: NORMAL
PATH REPORT.RELEVANT HX SPEC: NORMAL

## 2021-12-08 LAB
HUMAN PAPILLOMA VIRUS 16 DNA: NEGATIVE
HUMAN PAPILLOMA VIRUS 18 DNA: NEGATIVE
HUMAN PAPILLOMA VIRUS FINAL DIAGNOSIS: NORMAL
HUMAN PAPILLOMA VIRUS OTHER HR: NEGATIVE

## 2022-02-24 ENCOUNTER — TRANSFERRED RECORDS (OUTPATIENT)
Dept: HEALTH INFORMATION MANAGEMENT | Facility: CLINIC | Age: 42
End: 2022-02-24
Payer: COMMERCIAL

## 2022-05-21 ENCOUNTER — MYC MEDICAL ADVICE (OUTPATIENT)
Dept: OBGYN | Facility: CLINIC | Age: 42
End: 2022-05-21
Payer: COMMERCIAL

## 2022-05-21 DIAGNOSIS — L40.9 PSORIASIS OF SCALP: ICD-10-CM

## 2022-05-23 RX ORDER — CLOBETASOL PROPIONATE 0.5 MG/ML
SOLUTION TOPICAL
Qty: 50 ML | Refills: 3 | Status: SHIPPED | OUTPATIENT
Start: 2022-05-23

## 2022-05-23 NOTE — TELEPHONE ENCOUNTER
clobetasol (TEMOVATE) 0.05 % external solution was last prescribed on 12/20/19 by Dr. Agbeh for psoriasis of scalp.  This was for a 14 day course.    Pt had her last physical with Dr. Agbeh on 12/3/21.    Pt is asking for a refill of the clobetasol solution.    Katya Ayers RN

## 2022-08-23 ENCOUNTER — APPOINTMENT (OUTPATIENT)
Dept: URBAN - METROPOLITAN AREA CLINIC 252 | Age: 42
Setting detail: DERMATOLOGY
End: 2022-08-23

## 2022-08-23 VITALS — WEIGHT: 110 LBS | HEIGHT: 63 IN

## 2022-08-23 DIAGNOSIS — D22 MELANOCYTIC NEVI: ICD-10-CM

## 2022-08-23 DIAGNOSIS — L40.0 PSORIASIS VULGARIS: ICD-10-CM

## 2022-08-23 DIAGNOSIS — L72.0 EPIDERMAL CYST: ICD-10-CM

## 2022-08-23 DIAGNOSIS — D18.0 HEMANGIOMA: ICD-10-CM

## 2022-08-23 PROBLEM — D22.62 MELANOCYTIC NEVI OF LEFT UPPER LIMB, INCLUDING SHOULDER: Status: ACTIVE | Noted: 2022-08-23

## 2022-08-23 PROBLEM — D22.5 MELANOCYTIC NEVI OF TRUNK: Status: ACTIVE | Noted: 2022-08-23

## 2022-08-23 PROBLEM — D18.01 HEMANGIOMA OF SKIN AND SUBCUTANEOUS TISSUE: Status: ACTIVE | Noted: 2022-08-23

## 2022-08-23 PROBLEM — D22.61 MELANOCYTIC NEVI OF RIGHT UPPER LIMB, INCLUDING SHOULDER: Status: ACTIVE | Noted: 2022-08-23

## 2022-08-23 PROBLEM — D23.71 OTHER BENIGN NEOPLASM OF SKIN OF RIGHT LOWER LIMB, INCLUDING HIP: Status: ACTIVE | Noted: 2022-08-23

## 2022-08-23 PROCEDURE — 99214 OFFICE O/P EST MOD 30 MIN: CPT

## 2022-08-23 PROCEDURE — OTHER COSMETIC SHAVE REMOVAL (NO PATHOLOGY): OTHER

## 2022-08-23 PROCEDURE — OTHER PRESCRIPTION: OTHER

## 2022-08-23 PROCEDURE — OTHER MIPS QUALITY: OTHER

## 2022-08-23 PROCEDURE — OTHER COUNSELING: OTHER

## 2022-08-23 RX ORDER — CLOBETASOL PROPIONATE 0.5 MG/ML
0.05% SOLUTION TOPICAL BID
Qty: 50 | Refills: 11 | Status: ERX | COMMUNITY
Start: 2022-08-23

## 2022-08-23 ASSESSMENT — PGA PSORIASIS: PGA PSORIASIS 2020: MILD

## 2022-08-23 ASSESSMENT — LOCATION DETAILED DESCRIPTION DERM
LOCATION DETAILED: RIGHT ANTERIOR SHOULDER
LOCATION DETAILED: LEFT LATERAL INFERIOR EYELID
LOCATION DETAILED: MID-OCCIPITAL SCALP
LOCATION DETAILED: LEFT ANTERIOR PROXIMAL THIGH
LOCATION DETAILED: LEFT POSTERIOR SHOULDER
LOCATION DETAILED: RIGHT ANTERIOR DISTAL THIGH
LOCATION DETAILED: RIGHT CLAVICULAR SKIN
LOCATION DETAILED: HAIR
LOCATION DETAILED: LEFT POSTERIOR SHOULDER

## 2022-08-23 ASSESSMENT — LOCATION ZONE DERM
LOCATION ZONE: SCALP
LOCATION ZONE: ARM
LOCATION ZONE: TRUNK
LOCATION ZONE: LEG
LOCATION ZONE: EYELID
LOCATION ZONE: ARM

## 2022-08-23 ASSESSMENT — LOCATION SIMPLE DESCRIPTION DERM
LOCATION SIMPLE: LEFT INFERIOR EYELID
LOCATION SIMPLE: POSTERIOR SCALP
LOCATION SIMPLE: HAIR
LOCATION SIMPLE: RIGHT SHOULDER
LOCATION SIMPLE: LEFT SHOULDER
LOCATION SIMPLE: RIGHT CLAVICULAR SKIN
LOCATION SIMPLE: LEFT SHOULDER
LOCATION SIMPLE: RIGHT THIGH
LOCATION SIMPLE: LEFT THIGH

## 2022-08-23 ASSESSMENT — BSA PSORIASIS: % BODY COVERED IN PSORIASIS: 1

## 2022-08-23 NOTE — PROCEDURE: COSMETIC SHAVE REMOVAL (NO PATHOLOGY)
X Size Of Lesion In Cm (Optional): 0.5
Anesthesia Type: 2% lidocaine with epinephrine
Bill For Surgical Tray: no
Anesthesia Volume In Cc: 0.1
Post-Care Instructions: WOUND CARE:\\nDo NOT submerge wound in a bath, swimming pool, or hot tub for at least 1 week or for as long as there is an open wound. Gently cleanse the site daily with mild soap and water. Be careful NOT to allow a forceful stream of water to hit the biopsy site. After cleaning/showering, apply a thin layer of petrolatum ointment or Aquaphor in the wound followed by an adhesive bandage. Continue this process daily until healed. \\n\\nBLEEDING:\\nIf you develop persistent bleeding, apply firm and steady pressure over the dressing with gauze for 15 minutes. If bleeding persists, reapply pressure with an ice pack over the gauze for 15 minutes. NEVER APPLY ICE DIRECTLY TO THE SKIN. Do NOT peek under the gauze during these 15 minutes of pressure.  Call our office at [phone] if you cannot get the bleeding to stop. \\n\\nINFECTION:\\nSigns of infection may include increasing rather than decreasing pain (after the first few days), increasing redness/swelling/heat, draining pus, pink/red streaks around the wound, and fever or chills.  Please call our office immediately at [phone] if infection is suspected. It is normal to have some mild redness on or around the wound edges; this will lighten day by day and will become less tender.\\n\\nPAIN:\\nPain is usually minimal, but if needed you may take acetaminophen (Tylenol) every four hours as needed. Applying an ice pack over the dressing for 15-20 minutes every 2-3 hours will relieve swelling, lessen pain, and help minimize bruising. NEVER APPLY ICE DIRECTLY TO THE SKIN. Avoid ibuprofen (Advil, Motrin) and naproxen (Aleve) for the first 48 hours as these can increase bleeding.\\n\\nSWELLING AND BRUISING:\\nSwelling and bruising are common and temporary, usually lasting 1 - 2 weeks. It is more common in areas treated around the eyes, hands, and feet. In the days following the procedure, swelling and bruising can be minimized by keeping the affected areas elevated when possible, reducing salty foods, and applying ice packs over the dressing for 15-20 minutes every 2-3 hours. NEVER APPLY ICE DIRECTLY TO THE SKIN.\\n\\nITCHING:\\nItchiness on and around the wound is very common and can last several days to weeks after surgery. Mild itch is normal as the wound is healing. \\n\\nNERVE CHANGES:\\nNumbness is usually temporary, but it may last for several weeks to months. You may also experience sharp pains at the wound sight as it heals.  Mild pain is normal and will gradually improve with time.\\n \\nNO SMOKING:\\nSmoking will delay the healing process. If you smoke, we recommend trying to quit or at minimum reduce how much you smoke following a procedure.
Price (Use Numbers Only, No Special Characters Or $): 200
Wound Care: Petrolatum
Size Of Margin In Cm: 0
Consent: - Verbal and written consent was obtained, and risks were reviewed prior to procedure today. \\n- Risks discussed include but are not limited to scarring, infection, bleeding, scabbing, incomplete removal, nerve damage, pain, and allergy to anesthesia. \\n- Advised that in some cases nevi grow back after shave removal.\\n- All components of Universal Protocol/PAUSE Rule completed. \\n- Patient understands that this treatment is not medically necessary and therefore not billable to insurance. \\n- Patient understands that payment is due today before leaving clinic.\\n- Advised patient that it is our policy to recommend sending all specimen removed from the body to the pathologist to confirm that the lesion is indeed benign as clinically suspected. \\n- Discussed the anticipated cost of diagnostic pathology. \\n- Patient expressed understanding and made an informed decision today to decline diagnostic pathology.
Detail Level: Detailed
Hemostasis: Electrocautery and Aluminum Chloride

## 2022-08-23 NOTE — HPI: RASH
Is This A New Presentation, Or A Follow-Up?: Rash
Additional History: Uses clobetasol and this helps even after a single application. Denies joint stiffness pain.

## 2022-08-23 NOTE — PROCEDURE: COUNSELING
Patient Specific Counseling (Will Not Stick From Patient To Patient): - Discussed localized steroid injection to help calm down combined with use of topical steroid. She declined today. If clobetasol fails would consider Otezla.
Patient Specific Counseling (Will Not Stick From Patient To Patient): - Discussed treatment option of excision.
Detail Level: Detailed
Detail Level: Simple

## 2022-08-23 NOTE — PROCEDURE: COUNSELING
Detail Level: Detailed
Patient Specific Counseling (Will Not Stick From Patient To Patient): - Treatment is available but not medically necessary as patient denies any pain, itch, bleeding, or changes.\\n- Discussed option for cosmetic shave removal.\\n- Cost of removal: $200

## 2022-08-23 NOTE — PROCEDURE: MIPS QUALITY
Quality 110: Preventive Care And Screening: Influenza Immunization: Influenza Immunization Ordered or Recommended, but not Administered due to system reason
Detail Level: Detailed
Quality 431: Preventive Care And Screening: Unhealthy Alcohol Use - Screening: Patient not identified as an unhealthy alcohol user when screened for unhealthy alcohol use using a systematic screening method
Quality 226: Preventive Care And Screening: Tobacco Use: Screening And Cessation Intervention: Patient screened for tobacco use and is an ex/non-smoker

## 2022-09-03 ENCOUNTER — HEALTH MAINTENANCE LETTER (OUTPATIENT)
Age: 42
End: 2022-09-03

## 2022-12-05 ENCOUNTER — TELEPHONE (OUTPATIENT)
Dept: OBGYN | Facility: CLINIC | Age: 42
End: 2022-12-05

## 2022-12-05 ENCOUNTER — OFFICE VISIT (OUTPATIENT)
Dept: OBGYN | Facility: CLINIC | Age: 42
End: 2022-12-05
Payer: COMMERCIAL

## 2022-12-05 VITALS
WEIGHT: 112 LBS | DIASTOLIC BLOOD PRESSURE: 88 MMHG | HEART RATE: 82 BPM | SYSTOLIC BLOOD PRESSURE: 138 MMHG | BODY MASS INDEX: 19.97 KG/M2 | OXYGEN SATURATION: 100 %

## 2022-12-05 VITALS
WEIGHT: 112 LBS | OXYGEN SATURATION: 100 % | BODY MASS INDEX: 19.84 KG/M2 | HEART RATE: 82 BPM | HEIGHT: 63 IN | DIASTOLIC BLOOD PRESSURE: 88 MMHG | SYSTOLIC BLOOD PRESSURE: 138 MMHG

## 2022-12-05 DIAGNOSIS — Z01.419 ENCOUNTER FOR GYNECOLOGICAL EXAMINATION WITHOUT ABNORMAL FINDING: Primary | ICD-10-CM

## 2022-12-05 DIAGNOSIS — Z23 NEED FOR PROPHYLACTIC VACCINATION AND INOCULATION AGAINST INFLUENZA: ICD-10-CM

## 2022-12-05 DIAGNOSIS — R10.2 PERINEUM PAIN, FEMALE: Primary | ICD-10-CM

## 2022-12-05 DIAGNOSIS — Z23 HIGH PRIORITY FOR 2019-NCOV VACCINE: ICD-10-CM

## 2022-12-05 DIAGNOSIS — N90.89 VULVAL LESION: ICD-10-CM

## 2022-12-05 LAB
ALBUMIN SERPL-MCNC: 4.3 G/DL (ref 3.4–5)
ALP SERPL-CCNC: 50 U/L (ref 40–150)
ALT SERPL W P-5'-P-CCNC: 28 U/L (ref 0–50)
ANION GAP SERPL CALCULATED.3IONS-SCNC: 3 MMOL/L (ref 3–14)
AST SERPL W P-5'-P-CCNC: 16 U/L (ref 0–45)
BASOPHILS # BLD AUTO: 0 10E3/UL (ref 0–0.2)
BASOPHILS NFR BLD AUTO: 1 %
BILIRUB SERPL-MCNC: 0.7 MG/DL (ref 0.2–1.3)
BUN SERPL-MCNC: 13 MG/DL (ref 7–30)
CALCIUM SERPL-MCNC: 9.5 MG/DL (ref 8.5–10.1)
CHLORIDE BLD-SCNC: 106 MMOL/L (ref 94–109)
CHOLEST SERPL-MCNC: 160 MG/DL
CO2 SERPL-SCNC: 28 MMOL/L (ref 20–32)
CREAT SERPL-MCNC: 0.71 MG/DL (ref 0.52–1.04)
EOSINOPHIL # BLD AUTO: 0.1 10E3/UL (ref 0–0.7)
EOSINOPHIL NFR BLD AUTO: 2 %
ERYTHROCYTE [DISTWIDTH] IN BLOOD BY AUTOMATED COUNT: 12.7 % (ref 10–15)
FASTING STATUS PATIENT QL REPORTED: YES
GFR SERPL CREATININE-BSD FRML MDRD: >90 ML/MIN/1.73M2
GLUCOSE BLD-MCNC: 94 MG/DL (ref 70–99)
HCT VFR BLD AUTO: 40.7 % (ref 35–47)
HDLC SERPL-MCNC: 83 MG/DL
HGB BLD-MCNC: 13.4 G/DL (ref 11.7–15.7)
LDLC SERPL CALC-MCNC: 64 MG/DL
LYMPHOCYTES # BLD AUTO: 1.2 10E3/UL (ref 0.8–5.3)
LYMPHOCYTES NFR BLD AUTO: 34 %
MCH RBC QN AUTO: 31.3 PG (ref 26.5–33)
MCHC RBC AUTO-ENTMCNC: 32.9 G/DL (ref 31.5–36.5)
MCV RBC AUTO: 95 FL (ref 78–100)
MONOCYTES # BLD AUTO: 0.4 10E3/UL (ref 0–1.3)
MONOCYTES NFR BLD AUTO: 11 %
NEUTROPHILS # BLD AUTO: 1.9 10E3/UL (ref 1.6–8.3)
NEUTROPHILS NFR BLD AUTO: 52 %
NONHDLC SERPL-MCNC: 77 MG/DL
PLATELET # BLD AUTO: 278 10E3/UL (ref 150–450)
POTASSIUM BLD-SCNC: 3.7 MMOL/L (ref 3.4–5.3)
PROT SERPL-MCNC: 8.4 G/DL (ref 6.8–8.8)
RBC # BLD AUTO: 4.28 10E6/UL (ref 3.8–5.2)
SODIUM SERPL-SCNC: 137 MMOL/L (ref 133–144)
TRIGL SERPL-MCNC: 65 MG/DL
TSH SERPL DL<=0.005 MIU/L-ACNC: 2.13 MU/L (ref 0.4–4)
WBC # BLD AUTO: 3.7 10E3/UL (ref 4–11)

## 2022-12-05 PROCEDURE — 90686 IIV4 VACC NO PRSV 0.5 ML IM: CPT | Performed by: OBSTETRICS & GYNECOLOGY

## 2022-12-05 PROCEDURE — 90471 IMMUNIZATION ADMIN: CPT | Performed by: OBSTETRICS & GYNECOLOGY

## 2022-12-05 PROCEDURE — 11420 EXC H-F-NK-SP B9+MARG 0.5/<: CPT | Performed by: OBSTETRICS & GYNECOLOGY

## 2022-12-05 PROCEDURE — 80061 LIPID PANEL: CPT | Performed by: OBSTETRICS & GYNECOLOGY

## 2022-12-05 PROCEDURE — 0124A COVID-19 VACCINE BIVALENT BOOSTER 12+ (PFIZER): CPT | Performed by: OBSTETRICS & GYNECOLOGY

## 2022-12-05 PROCEDURE — 99396 PREV VISIT EST AGE 40-64: CPT | Mod: 25 | Performed by: OBSTETRICS & GYNECOLOGY

## 2022-12-05 PROCEDURE — 88305 TISSUE EXAM BY PATHOLOGIST: CPT | Performed by: PATHOLOGY

## 2022-12-05 PROCEDURE — 80050 GENERAL HEALTH PANEL: CPT | Performed by: OBSTETRICS & GYNECOLOGY

## 2022-12-05 PROCEDURE — 36415 COLL VENOUS BLD VENIPUNCTURE: CPT | Performed by: OBSTETRICS & GYNECOLOGY

## 2022-12-05 PROCEDURE — 91312 COVID-19 VACCINE BIVALENT BOOSTER 12+ (PFIZER): CPT | Performed by: OBSTETRICS & GYNECOLOGY

## 2022-12-05 NOTE — LETTER
[facility]  [address] [place] [id number] [phone]       Kenzie Davila   [address] [place] [address]        December 5, 2022        To Whom It May Concern:     Kenzie Davila is to be off work from 12/6/22 to 12/9/22.         CEPHAS AGBEH, MD.   OB-Gyn Department  [facility]

## 2022-12-05 NOTE — PATIENT INSTRUCTIONS
To Schedule an Appointment 24/7  Call: [phone]    If you have any questions regarding your visit, Please contact your care team.  [facility]: [phone]  [facility] HOURS TELEPHONE NUMBER   Cephas Agbeh, M.D.      Lorenza Stewart-Surgery Scheduler  Bri-Surgery Scheduler         Monday - [place]:    8:00 am-4:45 pm  Tuesday - [place]:   8:00 am-4:45 pm  Friday-[place]:       8:00 am-4:45 pm  Typical Surgery Day:  Wednesday [facility]   [address]   [place] [address] [phone]   Fax [phone]    Imaging Scheduling all locations  [phone]      [facility] Labor and Delivery   [address] [place] [address] Dr.   [place], [place] [address] [phone]    [facility]  [address] [facility] [address] [facility] [address]  [phone]  Fax [phone]     Urgent Care locations:  [facility] Monday-Friday                               10 am - 8 pm  Saturday and Sunday                      9 am - 5 pm  Monday-Friday                              10 am- 8 pm  Saturday and Sunday                      9 am - 5 pm    [phone]   **Surgeries** Our Surgery Schedulers will contact you to schedule. If you do not receive a call within 3 business days, please call [phone].  If you need a medication refill, please contact your pharmacy. Please allow 3 business days for your refill to be completed.  As always, Thank you for trusting us with your healthcare needs!  see additional instructions from your care team below

## 2022-12-05 NOTE — TELEPHONE ENCOUNTER
Pt last seen today for perianal anal lesion removal.    Pt asking for work note for 12/6-12/9 regarding being off work/restrictions for work. 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    RN routing to provider regarding letter, pt OK to receive it via [employer].    Rhonda Monteiro RN on 12/5/2022 at 11:54 AM    [facility]  [address] [facility] [address] [facility] [address]  [phone]  [phone]    Kenzie Davila   [address] [facility] [address]      December 5, 2022      To Whom It May Concern:    Kenzie Davila May is to be off work from 12/6/6to 12/9/22.       CEPHAS AGBEH, MD.   OB-Gyn Department  [facility]

## 2022-12-05 NOTE — PROGRESS NOTES
Kenzie is a 42 year old  here for annual exam.   She has a perianal anal lesion she wants removed later today.  Complaints of right breat discomfort. No palpable mass. No nipple discharge,  ROS: Ten point review of systems was reviewed and negative except the above.    She wants covid booster and flu shots    Health Maintenance   Topic Date Due     ADVANCE CARE PLANNING  Never done     HEPATITIS C SCREENING  Never done     COVID-19 Vaccine (3 - Booster for Moderna series) 2021     INFLUENZA VACCINE (1) 2022     YEARLY PREVENTIVE VISIT  2022     DTAP/TDAP/TD IMMUNIZATION (4 - Td or Tdap) 04/10/2024     HPV TEST  2024     PAP  2024     HIV SCREENING  Completed     PHQ-2 (once per calendar year)  Completed     HEPATITIS B IMMUNIZATION  Completed     Pneumococcal Vaccine: Pediatrics (0 to 5 Years) and At-Risk Patients (6 to 64 Years)  Aged Out     IPV IMMUNIZATION  Aged Out     MENINGITIS IMMUNIZATION  Aged Out     MAMMO SCREENING  Discontinued      Last pap:   Overview    ASC-US +(hr) HPV 2006. 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  14: Pap - NIL, Neg HPV. Plan cotest in 1 year. If Neg then cotest in 3 years, then to routine screening.   10/2/15 pap NIL/neg HPV. Plan: cotest 3 yrs  2018 Pap: NIL/neg HPV. Routine screening  21 NIL pap, Neg HPV. Every 3 year cotests for 25 years post LEEP          Last Mammogram:   Last Dexa: none  Last Colonoscopy: none  Lab Results   Component Value Date    CHOL 153 2020     Lab Results   Component Value Date    HDL 63 2020     Lab Results   Component Value Date    LDL 78 2020     Lab Results   Component Value Date    TRIG 58 2020     Lab Results   Component Value Date    CHOLHDLRATIO 2.5 10/12/2015         OBHX:    OB History    Para Term  AB Living   4 2 2 0 2 2   SAB IAB Ectopic Multiple Live Births   2 0 0 0 2      # Outcome Date GA Lbr Endy/2nd Weight Sex Delivery Anes PTL Lv  "  4 Term 05/15/14 38w3d 03:00 2.75 kg (6 lb 1 oz) M CS-LTranv Spinal  SHAILESH      Birth Comments: repeat in labor      Name: Stephan      Apgar1: 9  Apgar5: 9   3 SAB 13 7w6d       ND   2 Term 12 39w0d  2.92 kg (6 lb 7 oz) F -SEC   SHAILESH      Birth Comments: induced       Name: Edith   1 SAB 11 6w0d    SAB   ND      Birth Comments: spontaneous       Past Surgical History:   Procedure Laterality Date     ARTHROSCOPY KNEE  10/26/2012    Procedure: ARTHROSCOPY KNEE;  Right knee arthroscopy with synovectomy;  Surgeon: Benjamin Farmer MD;  Location: [facility] OR      SECTION  , 2014    x 2     COLPOSCOPY CERVIX, LOOP ELECTRODE BIOPSY, COMBINED      no abnormal paps since     GYN SURGERY      LEEP       PMH: Her past medical, surgical, and obstetric histories were reviewed and are documented in their appropriate chart areas.    ALL/Meds: Her medication and allergy histories were reviewed and are documented in their appropriate chart areas.    SH/FMH: Her social and family history was reviewed and documented in its appropriate chart area.    PE: /88 (BP Location: Left arm, Patient Position: Sitting, Cuff Size: Adult Regular)   Pulse 82   Ht 1.595 m (5' 2.8\")   Wt 50.8 kg (112 lb)   SpO2 100%   BMI 19.97 kg/m    Body mass index is 19.97 kg/m .    General Appearance:  healthy, alert, active, no distress  Cardiovascular:  Regular rate and Rhythm  Neck: Supple, no adenopathy and thyroid normal  Lungs:  Clear, without wheeze, rale or rhonchi  Breast: normal breast exam  Abdomen: Benign, Soft, flat, non-tender, No masses, organomegaly, No inguinal nodes and Bowel sounds normoactiveSoft, nontender.   Pelvic:       - Ext: Vulva and perineum are normal without lesion, mass or discharge        - Urethra: normal without discharge or scarring or hypermobility       - Urethral Meatus: normal appearance,        - Bladder: no tenderness, no masses       - Vagina: Normal mucosa, no " discharge     rugated       - Cervix: normal.IUD strings visible and appropriate.       - Uterus:Normal shape, position and consistencyfirm, nontender, nongravid uterus without CMT       - Adnexa: Normal without masses or tenderness       - Rectal: small external hemorrhoidal tags and right perianal external lesion  Nurse present for exam  A/P:  Well Woman,       ICD-10-CM    1. Encounter for gynecological examination without abnormal finding  Z01.419 CBC with Platelets & Differential     Comprehensive metabolic panel     Lipid panel reflex to direct LDL Fasting     TSH with free T4 reflex     MA Screen Bilateral w/Vladislav           - Encouraged self-breast exam   - Encouraged low fat diet, regular exercise, and adequate calcium intake.    CEPHAS AGBEH, MD.

## 2022-12-05 NOTE — TELEPHONE ENCOUNTER
RN pasted note into letter and sent to pt via [employer].    Rhonda Monteiro RN on 12/5/2022 at 12:38 PM

## 2022-12-05 NOTE — PROGRESS NOTES
PROCEDURE PROCEDURE NOTE:  The procedure was reviewed with patient.  After consenting to the procedure she was placed into the dorsal lithotomy position.  The examination was performed.  The right perianal area  was prepped with Betadine.  1% lidocaine was used for analgesia. A #11 scapel  was used to excise the lesion.  The tissue was removed and hemostasis was achieved with Silver Nitrate. $-0 vicryl interrupted suurtes were used to close the incision.  She tolerated the procedure well.   Instruments were removed and the specimen was sent to pathology.  Results to the patient in 1 week,  when available.     return to clinic in 4 weeks.      ICD-10-CM    1. Perineum pain, female  R10.2 EXCISION OF VAGINAL LESION     Surgical Pathology Exam      2. Vulval lesion  N90.89 EXCISION OF VAGINAL LESION     Surgical Pathology Exam        Nurse present for exam  CEPHAS AGBEH, MD.        .

## 2022-12-07 LAB
PATH REPORT.COMMENTS IMP SPEC: NORMAL
PATH REPORT.COMMENTS IMP SPEC: NORMAL
PATH REPORT.FINAL DX SPEC: NORMAL
PATH REPORT.GROSS SPEC: NORMAL
PATH REPORT.MICROSCOPIC SPEC OTHER STN: NORMAL
PATH REPORT.RELEVANT HX SPEC: NORMAL
PHOTO IMAGE: NORMAL

## 2023-01-09 ENCOUNTER — OFFICE VISIT (OUTPATIENT)
Dept: OBGYN | Facility: CLINIC | Age: 43
End: 2023-01-09
Payer: COMMERCIAL

## 2023-01-09 VITALS — SYSTOLIC BLOOD PRESSURE: 153 MMHG | OXYGEN SATURATION: 99 % | DIASTOLIC BLOOD PRESSURE: 88 MMHG | HEART RATE: 89 BPM

## 2023-01-09 DIAGNOSIS — Z09 POSTOP CHECK: Primary | ICD-10-CM

## 2023-01-09 PROCEDURE — 99212 OFFICE O/P EST SF 10 MIN: CPT | Performed by: OBSTETRICS & GYNECOLOGY

## 2023-01-09 NOTE — PATIENT INSTRUCTIONS
To Schedule an Appointment 24/7  Call: [phone]    If you have any questions regarding your visit, Please contact your care team.  [facility]: [phone]  [facility] HOURS TELEPHONE NUMBER   Cephas Agbeh, M.D.      Lorenza Stewart-Surgery Scheduler  Bri-Surgery Scheduler         Monday - [place]:    8:00 am-4:45 pm  Tuesday - [place]:   8:00 am-4:45 pm  Friday-[place]:       8:00 am-4:45 pm  Typical Surgery Day:  Wednesday [facility]   [address]   [place] [address] [phone]   Fax [phone]    Imaging Scheduling all locations  [phone]      [facility] Labor and Delivery   [address] [place] [address] [address].   [place], [place] [address] [phone]    [facility]  [address] [facility] [address]  [phone]  Fax [phone]     Urgent Care locations:  [facility] Monday-Friday                               10 am - 8 pm  Saturday and Sunday                      9 am - 5 pm  Monday-Friday                              10 am- 8 pm  Saturday and Sunday                      9 am - 5 pm    [phone]   **Surgeries** Our Surgery Schedulers will contact you to schedule. If you do not receive a call within 3 business days, please call [phone].  If you need a medication refill, please contact your pharmacy. Please allow 3 business days for your refill to be completed.  As always, Thank you for trusting us with your healthcare needs!  see additional instructions from your care team below

## 2023-01-09 NOTE — PROGRESS NOTES
Kenzie is a 42 year old  5 weeks s/p  perineal lesion / excision / lesion removalcomplicated by no problems reported.  She is currently requiring nothing for pain management.- GI/ complaints.  Energy level is High.  Denies fever.    The pathology report showed:   Specimens  A Perineum  .  .  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  - Mild epidermal hyperplasia without atypia.]  - Mild increase in pigmentation in the basal layer of the epidermis without an increase in melanocytes.    PE: BP (!) 153/88 (BP Location: Left arm, Patient Position: Chair, Cuff Size: Adult Regular)   Pulse 89   SpO2 99%   Breastfeeding No   Abd: soft, non tender, no masses  Incision: intact, no erythema, induration or discharge  stiches removed,Ext. Genitalia: Normal    Nurse present for exam  A/P 5 weeks s/p surgery, doing well     ICD-10-CM    1. Postop check  Z09         CEPHAS AGBEH, MD.      CEPHAS AGBEH, MD.

## 2023-08-14 ENCOUNTER — ANCILLARY PROCEDURE (OUTPATIENT)
Dept: MAMMOGRAPHY | Facility: CLINIC | Age: 43
End: 2023-08-14
Attending: OBSTETRICS & GYNECOLOGY
Payer: COMMERCIAL

## 2023-08-14 DIAGNOSIS — Z01.419 ENCOUNTER FOR GYNECOLOGICAL EXAMINATION WITHOUT ABNORMAL FINDING: ICD-10-CM

## 2023-08-14 PROCEDURE — 77067 SCR MAMMO BI INCL CAD: CPT | Mod: TC | Performed by: RADIOLOGY

## 2023-08-22 ENCOUNTER — ANCILLARY PROCEDURE (OUTPATIENT)
Dept: MAMMOGRAPHY | Facility: CLINIC | Age: 43
End: 2023-08-22
Attending: OBSTETRICS & GYNECOLOGY
Payer: COMMERCIAL

## 2023-08-22 DIAGNOSIS — R92.8 ABNORMAL MAMMOGRAM: ICD-10-CM

## 2023-08-22 PROCEDURE — G0279 TOMOSYNTHESIS, MAMMO: HCPCS

## 2023-08-22 PROCEDURE — 77065 DX MAMMO INCL CAD UNI: CPT | Mod: LT

## 2023-08-28 ENCOUNTER — TRANSFERRED RECORDS (OUTPATIENT)
Dept: HEALTH INFORMATION MANAGEMENT | Facility: CLINIC | Age: 43
End: 2023-08-28
Payer: COMMERCIAL

## 2023-11-06 ENCOUNTER — PATIENT OUTREACH (OUTPATIENT)
Dept: CARE COORDINATION | Facility: CLINIC | Age: 43
End: 2023-11-06
Payer: COMMERCIAL

## 2023-11-20 ENCOUNTER — PATIENT OUTREACH (OUTPATIENT)
Dept: CARE COORDINATION | Facility: CLINIC | Age: 43
End: 2023-11-20
Payer: COMMERCIAL

## 2023-12-08 ENCOUNTER — RX ONLY (RX ONLY)
Age: 43
End: 2023-12-08

## 2023-12-08 RX ORDER — CLOBETASOL PROPIONATE 0.5 MG/ML
0.05% SOLUTION TOPICAL BID
Qty: 50 | Refills: 0 | Status: ERX | COMMUNITY
Start: 2023-12-08

## 2023-12-29 ENCOUNTER — OFFICE VISIT (OUTPATIENT)
Dept: OBGYN | Facility: CLINIC | Age: 43
End: 2023-12-29
Payer: COMMERCIAL

## 2023-12-29 VITALS
DIASTOLIC BLOOD PRESSURE: 81 MMHG | HEART RATE: 71 BPM | BODY MASS INDEX: 20.16 KG/M2 | SYSTOLIC BLOOD PRESSURE: 124 MMHG | WEIGHT: 113.8 LBS | HEIGHT: 63 IN | OXYGEN SATURATION: 100 %

## 2023-12-29 DIAGNOSIS — Z01.419 ENCOUNTER FOR GYNECOLOGICAL EXAMINATION WITHOUT ABNORMAL FINDING: Primary | ICD-10-CM

## 2023-12-29 LAB
ALBUMIN SERPL BCG-MCNC: 4.5 G/DL (ref 3.5–5.2)
ALP SERPL-CCNC: 47 U/L (ref 40–150)
ALT SERPL W P-5'-P-CCNC: 18 U/L (ref 0–50)
ANION GAP SERPL CALCULATED.3IONS-SCNC: 12 MMOL/L (ref 7–15)
AST SERPL W P-5'-P-CCNC: 25 U/L (ref 0–45)
BASOPHILS # BLD AUTO: 0 10E3/UL (ref 0–0.2)
BASOPHILS NFR BLD AUTO: 0 %
BILIRUB SERPL-MCNC: 0.4 MG/DL
BUN SERPL-MCNC: 12.2 MG/DL (ref 6–20)
CALCIUM SERPL-MCNC: 9.5 MG/DL (ref 8.6–10)
CHLORIDE SERPL-SCNC: 103 MMOL/L (ref 98–107)
CREAT SERPL-MCNC: 0.72 MG/DL (ref 0.51–0.95)
DEPRECATED HCO3 PLAS-SCNC: 24 MMOL/L (ref 22–29)
EGFRCR SERPLBLD CKD-EPI 2021: >90 ML/MIN/1.73M2
EOSINOPHIL # BLD AUTO: 0 10E3/UL (ref 0–0.7)
EOSINOPHIL NFR BLD AUTO: 0 %
ERYTHROCYTE [DISTWIDTH] IN BLOOD BY AUTOMATED COUNT: 12.2 % (ref 10–15)
GLUCOSE SERPL-MCNC: 101 MG/DL (ref 70–99)
HCT VFR BLD AUTO: 37.4 % (ref 35–47)
HGB BLD-MCNC: 12.3 G/DL (ref 11.7–15.7)
IMM GRANULOCYTES # BLD: 0 10E3/UL
IMM GRANULOCYTES NFR BLD: 0 %
LYMPHOCYTES # BLD AUTO: 1.7 10E3/UL (ref 0.8–5.3)
LYMPHOCYTES NFR BLD AUTO: 23 %
MCH RBC QN AUTO: 31.2 PG (ref 26.5–33)
MCHC RBC AUTO-ENTMCNC: 32.9 G/DL (ref 31.5–36.5)
MCV RBC AUTO: 95 FL (ref 78–100)
MONOCYTES # BLD AUTO: 0.5 10E3/UL (ref 0–1.3)
MONOCYTES NFR BLD AUTO: 6 %
NEUTROPHILS # BLD AUTO: 5.1 10E3/UL (ref 1.6–8.3)
NEUTROPHILS NFR BLD AUTO: 70 %
PLATELET # BLD AUTO: 252 10E3/UL (ref 150–450)
POTASSIUM SERPL-SCNC: 3.7 MMOL/L (ref 3.4–5.3)
PROT SERPL-MCNC: 7.6 G/DL (ref 6.4–8.3)
RBC # BLD AUTO: 3.94 10E6/UL (ref 3.8–5.2)
SODIUM SERPL-SCNC: 139 MMOL/L (ref 135–145)
TSH SERPL DL<=0.005 MIU/L-ACNC: 1.66 UIU/ML (ref 0.3–4.2)
WBC # BLD AUTO: 7.3 10E3/UL (ref 4–11)

## 2023-12-29 PROCEDURE — 36415 COLL VENOUS BLD VENIPUNCTURE: CPT | Performed by: OBSTETRICS & GYNECOLOGY

## 2023-12-29 PROCEDURE — 80053 COMPREHEN METABOLIC PANEL: CPT | Performed by: OBSTETRICS & GYNECOLOGY

## 2023-12-29 PROCEDURE — 99396 PREV VISIT EST AGE 40-64: CPT | Performed by: OBSTETRICS & GYNECOLOGY

## 2023-12-29 PROCEDURE — 84443 ASSAY THYROID STIM HORMONE: CPT | Performed by: OBSTETRICS & GYNECOLOGY

## 2023-12-29 PROCEDURE — 85025 COMPLETE CBC W/AUTO DIFF WBC: CPT | Performed by: OBSTETRICS & GYNECOLOGY

## 2023-12-29 NOTE — PROGRESS NOTES
Kenzie is a 43 year old  here for annual exam.   No menses with Mirena IUD placed. 2019.  Await expires in ..  Normal mammogram after callback.    ROS: Ten point review of systems was reviewed and negative except the above.    Health Maintenance   Topic Date Due    ADVANCE CARE PLANNING  Never done    HEPATITIS C SCREENING  Never done    COVID-19 Vaccine ( - -24 season) 2023    YEARLY PREVENTIVE VISIT  2023    DTAP/TDAP/TD IMMUNIZATION (4 - Td or Tdap) 04/10/2024    HPV TEST  2024    PAP  2024    HIV SCREENING  Completed    PHQ-2 (once per calendar year)  Completed    INFLUENZA VACCINE  Completed    HEPATITIS B IMMUNIZATION  Completed    Pneumococcal Vaccine: Pediatrics (0 to 5 Years) and At-Risk Patients (6 to 64 Years)  Aged Out    IPV IMMUNIZATION  Aged Out    HPV IMMUNIZATION  Aged Out    MENINGITIS IMMUNIZATION  Aged Out    RSV MONOCLONAL ANTIBODY  Aged Out    MAMMO SCREENING  Discontinued      Last pap:   Last Mammogram:   Last Dexa: none  Last Colonoscopy: none  Lab Results   Component Value Date    CHOL 160 2022    CHOL 153 2020     Lab Results   Component Value Date    HDL 83 2022    HDL 63 2020     Lab Results   Component Value Date    LDL 64 2022    LDL 78 2020     Lab Results   Component Value Date    TRIG 65 2022    TRIG 58 2020     Lab Results   Component Value Date    CHOLHDLRATIO 2.5 10/12/2015         OBHX:    OB History    Para Term  AB Living   4 2 2 0 2 2   SAB IAB Ectopic Multiple Live Births   2 0 0 0 2      # Outcome Date GA Lbr Endy/2nd Weight Sex Delivery Anes PTL Lv   4 Term 05/15/14 38w3d 03:00 2.75 kg (6 lb 1 oz) M CS-LTranv Spinal  SHAILESH      Birth Comments: repeat in labor      Name: Stephan      Apgar1: 9  Apgar5: 9   3 SAB 13 7w6d       ND   2 Term 12 39w0d  2.92 kg (6 lb 7 oz) F -SEC   SHAILESH      Birth Comments: induced       Name: Edith   1 SAB  "11 6w0d    SAB   ND      Birth Comments: spontaneous       Gyn surg.:  Past Surgical History:   Procedure Laterality Date    ARTHROSCOPY KNEE  10/26/2012    Procedure: ARTHROSCOPY KNEE;  Right knee arthroscopy with synovectomy;  Surgeon: Benjamin Farmer MD;  Location: [facility] OR     SECTION  , 2014    x 2    COLPOSCOPY CERVIX, LOOP ELECTRODE BIOPSY, COMBINED      no abnormal paps since    GYN SURGERY      LEEP      PMH: Her past medical, surgical, and obstetric histories were reviewed and are documented in their appropriate chart areas.    ALL/Meds: Her medication and allergy histories were reviewed and are documented in their appropriate chart areas.    SH/FMH: Her social and family history was reviewed and documented in its appropriate chart area.    PE: /81 (BP Location: Right arm, Patient Position: Chair, Cuff Size: Adult Regular)   Pulse 71   Ht 1.595 m (5' 2.8\")   Wt 51.6 kg (113 lb 12.8 oz)   SpO2 100%   BMI 20.29 kg/m    Body mass index is 20.29 kg/m .    General Appearance:  healthy, alert, active, no distress  Cardiovascular:  Regular rate and Rhythm  Neck: Supple, no adenopathy, and thyroid normal  Lungs:  Clear, without wheeze, rale or rhonchi  Breast: Declined. Normal mammogram 3 months ago.  Abdomen: Benign, Soft, flat, non-tender, No masses, organomegaly, No inguinal nodes, and Bowel sounds normoactiveSoft, nontender.   Pelvic:       - Ext: Vulva and perineum are normal without lesion, mass or discharge        - Urethra: normal without discharge or scarring no hypermobility       - Urethral Meatus: normal appearance,        - Bladder: no tenderness, no masses       - Vagina: Normal mucosa, no discharge     atrophic       - Cervix: normal.,IUD strings are short.       - Uterus:Normal shape, position and consistencyfirm, nontender, nongravid uterus without CMT       - Adnexa: Normal without masses or tenderness       - Rectal: deferred  Nurse for exam  A/P:  Well Woman, "     ICD-10-CM    1. Encounter for gynecological examination without abnormal finding  Z01.419 CBC with Platelets & Differential     Comprehensive metabolic panel     TSH with free T4 reflex     CBC with Platelets & Differential     Comprehensive metabolic panel     TSH with free T4 reflex             - Encouraged self-breast exam   - Encouraged low fat diet, regular exercise, and adequate calcium intake.    CEPHAS AGBEH, MD.

## 2023-12-29 NOTE — PATIENT INSTRUCTIONS
To Schedule an Appointment 24/7  Call: [phone]    If you have any questions regarding your visit, Please contact your care team.  [facility]: [phone]  [facility] HOURS TELEPHONE NUMBER   Cephas Agbeh, M.D.      Pau Stewart-Surgery Scheduler  Bri-Surgery Scheduler       Monday - [place]:    8:00 am-4:45 pm  Tuesday - [place]:   8:00 am-4:45 pm  Friday-[place]:       8:00 am-4:45 pm  Typical Surgery Day:  Wednesday [facility]   [address] [place] [address]   [phone]   Fax [phone]    Imaging Scheduling all locations  [phone]      [facility] Labor and Delivery   [address] [place] [address] [place] [address]   [phone]    [facility]  [address] [facility] [address]  [phone]  Fax [phone]   Urgent Care locations:  [facility] Monday-Friday                               10 am - 8 pm  Saturday and Sunday                      9 am - 5 pm  Monday-Friday                              10 am- 8 pm  Saturday and Sunday                      9 am - 5 pm    [phone]   **Surgeries** Our Surgery Schedulers will contact you to schedule. If you do not receive a call within 3 business days, please call [phone].  If you need a medication refill, please contact your pharmacy. Please allow 3 business days for your refill to be completed.  As always, Thank you for trusting us with your healthcare needs!  see additional instructions from your care team below

## 2024-08-07 ENCOUNTER — APPOINTMENT (OUTPATIENT)
Dept: URBAN - METROPOLITAN AREA CLINIC 252 | Age: 44
Setting detail: DERMATOLOGY
End: 2024-08-12

## 2024-08-07 VITALS — HEIGHT: 63 IN | RESPIRATION RATE: 18 BRPM | WEIGHT: 110 LBS

## 2024-08-07 DIAGNOSIS — L40.0 PSORIASIS VULGARIS: ICD-10-CM

## 2024-08-07 DIAGNOSIS — L72.8 OTHER FOLLICULAR CYSTS OF THE SKIN AND SUBCUTANEOUS TISSUE: ICD-10-CM

## 2024-08-07 PROCEDURE — OTHER PRESCRIPTION: OTHER

## 2024-08-07 PROCEDURE — OTHER COUNSELING: OTHER

## 2024-08-07 PROCEDURE — 99214 OFFICE O/P EST MOD 30 MIN: CPT

## 2024-08-07 RX ORDER — KETOCONAZOLE 20 MG/ML
2% SHAMPOO, SUSPENSION TOPICAL PRN
Qty: 120 | Refills: 11 | Status: ERX | COMMUNITY
Start: 2024-08-07

## 2024-08-07 RX ORDER — CLOBETASOL PROPIONATE 0.5 MG/ML
0.05% SOLUTION TOPICAL BID
Qty: 50 | Refills: 11 | Status: ERX

## 2024-08-07 ASSESSMENT — BSA PSORIASIS: % BODY COVERED IN PSORIASIS: 3

## 2024-08-07 ASSESSMENT — LOCATION ZONE DERM
LOCATION ZONE: TRUNK
LOCATION ZONE: SCALP

## 2024-08-07 ASSESSMENT — LOCATION SIMPLE DESCRIPTION DERM
LOCATION SIMPLE: ABDOMEN
LOCATION SIMPLE: POSTERIOR SCALP

## 2024-08-07 ASSESSMENT — LOCATION DETAILED DESCRIPTION DERM
LOCATION DETAILED: MID-OCCIPITAL SCALP
LOCATION DETAILED: XIPHOID

## 2024-08-07 ASSESSMENT — ITCH NUMERIC RATING SCALE: HOW SEVERE IS YOUR ITCHING?: 5

## 2024-08-07 NOTE — PROCEDURE: COUNSELING
Patient Specific Counseling (Will Not Stick From Patient To Patient): Discussed systematic treatment options, however pt will hold off for now and continue with topicals.\\nRecommend omega oil supplement and multivitamin
Detail Level: Simple
Detail Level: Detailed

## 2024-08-07 NOTE — HPI: RASH
Is This A New Presentation, Or A Follow-Up?: Rash
Additional History: DX: Psoriasis \\nUses clobetasol and this helps even after a single application. Denies joint stiffness pain.\\nPatient would like to revisit treatment options, has been using clobetasol for 20 years
[facility]

## 2024-08-30 ENCOUNTER — ANCILLARY PROCEDURE (OUTPATIENT)
Dept: MAMMOGRAPHY | Facility: CLINIC | Age: 44
End: 2024-08-30
Attending: OBSTETRICS & GYNECOLOGY
Payer: COMMERCIAL

## 2024-08-30 DIAGNOSIS — Z12.31 VISIT FOR SCREENING MAMMOGRAM: ICD-10-CM

## 2024-08-30 PROCEDURE — 77063 BREAST TOMOSYNTHESIS BI: CPT | Mod: TC | Performed by: RADIOLOGY

## 2024-08-30 PROCEDURE — 77067 SCR MAMMO BI INCL CAD: CPT | Mod: TC | Performed by: RADIOLOGY

## 2024-11-02 ENCOUNTER — TRANSFERRED RECORDS (OUTPATIENT)
Dept: HEALTH INFORMATION MANAGEMENT | Facility: CLINIC | Age: 44
End: 2024-11-02
Payer: COMMERCIAL

## 2024-12-17 ENCOUNTER — PATIENT OUTREACH (OUTPATIENT)
Dept: CARE COORDINATION | Facility: CLINIC | Age: 44
End: 2024-12-17
Payer: COMMERCIAL

## 2024-12-31 ENCOUNTER — PATIENT OUTREACH (OUTPATIENT)
Dept: CARE COORDINATION | Facility: CLINIC | Age: 44
End: 2024-12-31
Payer: COMMERCIAL

## 2025-02-08 ENCOUNTER — HEALTH MAINTENANCE LETTER (OUTPATIENT)
Age: 45
End: 2025-02-08

## 2025-05-01 ENCOUNTER — PATIENT OUTREACH (OUTPATIENT)
Dept: CARE COORDINATION | Facility: CLINIC | Age: 45
End: 2025-05-01
Payer: COMMERCIAL

## 2025-06-06 NOTE — PATIENT INSTRUCTIONS
To Schedule an Appointment 24/7  Call: [phone]    If you have any questions regarding your visit, Please contact your care team.  [employer] [facility]: [phone]  [facility] HOURS TELEPHONE NUMBER   Cephas Agbeh, M.D.      Pau Stewart-Surgery Scheduler  Bri-Surgery Scheduler       Monday - [place]:    8:00 am-4:45 pm  Tuesday - [place]:   8:00 am-4:45 pm  Friday-[place]:       8:00 am-4:45 pm  Typical Surgery Day:  Wednesday [facility]   [address] [place] [address]   [phone]   Fax [phone]    Imaging Scheduling all locations  [phone]      [facility] Labor and Delivery   [address] [place] [address] [place] [address]   [phone]    [facility]  [address] [facility] [address]  [phone]  Fax [phone]   Urgent Care locations:  [facility] Monday-Friday                               10 am - 8 pm  Saturday and Sunday                      9 am - 5 pm  Monday-Friday                              10 am- 8 pm  Saturday and Sunday                      9 am - 5 pm    [phone]   **Surgeries** Our Surgery Schedulers will contact you to schedule. If you do not receive a call within 3 business days, please call [phone].  If you need a medication refill, please contact your pharmacy. Please allow 3 business days for your refill to be completed.  As always, Thank you for trusting us with your healthcare needs!  see additional instructions from your care team below

## 2025-06-16 ENCOUNTER — OFFICE VISIT (OUTPATIENT)
Dept: OBGYN | Facility: CLINIC | Age: 45
End: 2025-06-16
Payer: COMMERCIAL

## 2025-06-16 ENCOUNTER — RESULTS FOLLOW-UP (OUTPATIENT)
Dept: OBGYN | Facility: CLINIC | Age: 45
End: 2025-06-16

## 2025-06-16 VITALS
SYSTOLIC BLOOD PRESSURE: 104 MMHG | OXYGEN SATURATION: 100 % | DIASTOLIC BLOOD PRESSURE: 68 MMHG | HEART RATE: 72 BPM | BODY MASS INDEX: 19.97 KG/M2 | WEIGHT: 112 LBS

## 2025-06-16 DIAGNOSIS — Z01.419 ENCOUNTER FOR GYNECOLOGICAL EXAMINATION WITHOUT ABNORMAL FINDING: Primary | ICD-10-CM

## 2025-06-16 LAB
ALBUMIN SERPL BCG-MCNC: 4.5 G/DL (ref 3.5–5.2)
ALP SERPL-CCNC: 44 U/L (ref 40–150)
ALT SERPL W P-5'-P-CCNC: 15 U/L (ref 0–50)
ANION GAP SERPL CALCULATED.3IONS-SCNC: 10 MMOL/L (ref 7–15)
AST SERPL W P-5'-P-CCNC: 20 U/L (ref 0–45)
BASOPHILS # BLD AUTO: 0 10E3/UL (ref 0–0.2)
BASOPHILS NFR BLD AUTO: 0 %
BILIRUB SERPL-MCNC: 0.5 MG/DL
BUN SERPL-MCNC: 15.1 MG/DL (ref 6–20)
CALCIUM SERPL-MCNC: 9.6 MG/DL (ref 8.8–10.4)
CHLORIDE SERPL-SCNC: 103 MMOL/L (ref 98–107)
CHOLEST SERPL-MCNC: 148 MG/DL
CREAT SERPL-MCNC: 0.83 MG/DL (ref 0.51–0.95)
EGFRCR SERPLBLD CKD-EPI 2021: 88 ML/MIN/1.73M2
EOSINOPHIL # BLD AUTO: 0 10E3/UL (ref 0–0.7)
EOSINOPHIL NFR BLD AUTO: 1 %
ERYTHROCYTE [DISTWIDTH] IN BLOOD BY AUTOMATED COUNT: 12.1 % (ref 10–15)
FASTING STATUS PATIENT QL REPORTED: YES
FASTING STATUS PATIENT QL REPORTED: YES
GLUCOSE SERPL-MCNC: 88 MG/DL (ref 70–99)
HCO3 SERPL-SCNC: 26 MMOL/L (ref 22–29)
HCT VFR BLD AUTO: 38.1 % (ref 35–47)
HDLC SERPL-MCNC: 64 MG/DL
HGB BLD-MCNC: 12.9 G/DL (ref 11.7–15.7)
HPV HR 12 DNA CVX QL NAA+PROBE: NEGATIVE
HPV16 DNA CVX QL NAA+PROBE: NEGATIVE
HPV18 DNA CVX QL NAA+PROBE: NEGATIVE
HUMAN PAPILLOMA VIRUS FINAL DIAGNOSIS: NORMAL
IMM GRANULOCYTES # BLD: 0 10E3/UL
IMM GRANULOCYTES NFR BLD: 0 %
LDLC SERPL CALC-MCNC: 76 MG/DL
LYMPHOCYTES # BLD AUTO: 1.6 10E3/UL (ref 0.8–5.3)
LYMPHOCYTES NFR BLD AUTO: 30 %
MCH RBC QN AUTO: 31.4 PG (ref 26.5–33)
MCHC RBC AUTO-ENTMCNC: 33.9 G/DL (ref 31.5–36.5)
MCV RBC AUTO: 93 FL (ref 78–100)
MONOCYTES # BLD AUTO: 0.4 10E3/UL (ref 0–1.3)
MONOCYTES NFR BLD AUTO: 8 %
NEUTROPHILS # BLD AUTO: 3.2 10E3/UL (ref 1.6–8.3)
NEUTROPHILS NFR BLD AUTO: 61 %
NONHDLC SERPL-MCNC: 84 MG/DL
PLATELET # BLD AUTO: 239 10E3/UL (ref 150–450)
POTASSIUM SERPL-SCNC: 4.1 MMOL/L (ref 3.4–5.3)
PROT SERPL-MCNC: 7.4 G/DL (ref 6.4–8.3)
RBC # BLD AUTO: 4.11 10E6/UL (ref 3.8–5.2)
SODIUM SERPL-SCNC: 139 MMOL/L (ref 135–145)
TRIGL SERPL-MCNC: 41 MG/DL
TSH SERPL DL<=0.005 MIU/L-ACNC: 2.41 UIU/ML (ref 0.3–4.2)
WBC # BLD AUTO: 5.3 10E3/UL (ref 4–11)

## 2025-06-16 PROCEDURE — 84443 ASSAY THYROID STIM HORMONE: CPT | Performed by: OBSTETRICS & GYNECOLOGY

## 2025-06-16 PROCEDURE — 99396 PREV VISIT EST AGE 40-64: CPT | Performed by: OBSTETRICS & GYNECOLOGY

## 2025-06-16 PROCEDURE — 99459 PELVIC EXAMINATION: CPT | Performed by: OBSTETRICS & GYNECOLOGY

## 2025-06-16 PROCEDURE — 87624 HPV HI-RISK TYP POOLED RSLT: CPT | Performed by: OBSTETRICS & GYNECOLOGY

## 2025-06-16 PROCEDURE — 36415 COLL VENOUS BLD VENIPUNCTURE: CPT | Performed by: OBSTETRICS & GYNECOLOGY

## 2025-06-16 PROCEDURE — 3078F DIAST BP <80 MM HG: CPT | Performed by: OBSTETRICS & GYNECOLOGY

## 2025-06-16 PROCEDURE — 80061 LIPID PANEL: CPT | Performed by: OBSTETRICS & GYNECOLOGY

## 2025-06-16 PROCEDURE — 80053 COMPREHEN METABOLIC PANEL: CPT | Performed by: OBSTETRICS & GYNECOLOGY

## 2025-06-16 PROCEDURE — 85025 COMPLETE CBC W/AUTO DIFF WBC: CPT | Performed by: OBSTETRICS & GYNECOLOGY

## 2025-06-16 PROCEDURE — 3074F SYST BP LT 130 MM HG: CPT | Performed by: OBSTETRICS & GYNECOLOGY

## 2025-06-16 NOTE — PROGRESS NOTES
Kenzie is a 45 year old  here for annual exam.   Patient has no complaints.  She has no menses with her Mirena IUD.  Expiration is in       ROS: Ten point review of systems was reviewed and negative except the above.    Health Maintenance   Topic Date Due    ADVANCE CARE PLANNING  Never done    COLORECTAL CANCER SCREENING  Never done    HEPATITIS C SCREENING  Never done    LIPID  2023    DTAP/TDAP/TD VACCINE (4 - Td or Tdap) 04/10/2024    COVID-19 VACCINE (3 -  season) 2024    HPV TEST  2024    PAP  2024    YEARLY PREVENTIVE VISIT  2024    PHQ-2 (once per calendar year)  2025    MAMMO SCREENING  2026    DIABETES SCREENING  2026    ZOSTER VACCINE (1 of 2) 05/15/2030    HIV SCREENING  Completed    INFLUENZA VACCINE  Completed    HEPATITIS B VACCINE  Completed    PNEUMOCOCCAL VACCINE: PEDIATRICS (0 to 5 YEARS) AND AT-RISK PATIENTS (6 to 49 YEARS)  Aged Out    HPV VACCINE  Aged Out    MENINGITIS VACCINE  Aged Out      Last pap:   Last Mammogram:   Last Dexa: none  Last Colonoscopy: none  Lab Results   Component Value Date    CHOL 160 2022    CHOL 153 2020     Lab Results   Component Value Date    HDL 83 2022    HDL 63 2020     Lab Results   Component Value Date    LDL 64 2022    LDL 78 2020     Lab Results   Component Value Date    TRIG 65 2022    TRIG 58 2020     Lab Results   Component Value Date    CHOLHDLRATIO 2.5 10/12/2015         OBHX:    OB History    Para Term  AB Living   4 2 2 0 2 2   SAB IAB Ectopic Multiple Live Births   2 0 0 0 2      # Outcome Date GA Lbr Endy/2nd Weight Sex Type Anes PTL Lv   4 Term 05/15/14 38w3d 03:00 2.75 kg (6 lb 1 oz) M CS-LTranv Spinal  SHAILESH      Birth Comments: repeat in labor      Name: Stephan      Apgar1: 9  Apgar5: 9   3 SAB 13 7w6d       ND   2 Term 12 39w0d  2.92 kg (6 lb 7 oz) F -SEC   SHAILESH      Birth Comments: induced        Name: Edith Tao SAB 11 6w0d    SAB   ND      Birth Comments: spontaneous      Gyn surg.:  Past Surgical History:   Procedure Laterality Date    ARTHROSCOPY KNEE  10/26/2012    Procedure: ARTHROSCOPY KNEE;  Right knee arthroscopy with synovectomy;  Surgeon: Benjamin Farmer MD;  Location: [facility] OR     SECTION  , 2014    x 2    COLPOSCOPY CERVIX, LOOP ELECTRODE BIOPSY, COMBINED      no abnormal paps since    GYN SURGERY      LEEP        PMH: Her past medical, surgical, and obstetric histories were reviewed and are documented in their appropriate chart areas.    ALL/Meds: Her medication and allergy histories were reviewed and are documented in their appropriate chart areas.    SH/FMH: Her social and family history was reviewed and documented in its appropriate chart area.    PE: /68 (BP Location: Left arm, Patient Position: Sitting, Cuff Size: Adult Small)   Pulse 72   Wt 50.8 kg (112 lb)   SpO2 100%   BMI 19.97 kg/m    Body mass index is 19.97 kg/m .    General Appearance:  healthy, alert, active, no distress  Cardiovascular:  Regular rate and Rhythm  Neck: Supple, no adenopathy, and thyroid normal  Lungs:  Clear, without wheeze, rale or rhonchi  Breast: normal breast exam  Abdomen: Benign, Soft, flat, non-tender, No masses, organomegaly, No inguinal nodes, and Bowel sounds normoactiveSoft, nontender.   Pelvic:       - Ext: Vulva and perineum are normal without lesion, mass or discharge        - Urethra: normal without discharge or scarring no hypermobility       - Urethral Meatus: normal appearance,        - Bladder: no tenderness, no masses       - Vagina: Normal mucosa, no discharge     rugated       - Cervix: normal. Short iud strings       - Uterus:Normal shape, position and consistencyfirm, nontender, nongravid uterus without CMT       - Adnexa: Normal without masses or tenderness       - Rectal: deferred    A/P:  Well Woman,     ICD-10-CM    1. Encounter for  gynecological examination without abnormal finding  Z01.419 CBC with Platelets & Differential     Comprehensive metabolic panel     Lipid panel reflex to direct LDL Fasting     TSH with free T4 reflex     HPV and Gynecologic Cytology Panel - Recommended Age 30-65 Years     MA Screen Bilateral w/Vladislav     CBC with Platelets & Differential     Comprehensive metabolic panel     Lipid panel reflex to direct LDL Fasting     TSH with free T4 reflex             - Encouraged self-breast exam   - Encouraged low fat diet, regular exercise, and adequate calcium intake.    CEPHAS AGBEH, MD.

## 2025-06-17 ENCOUNTER — PATIENT OUTREACH (OUTPATIENT)
Dept: CARE COORDINATION | Facility: CLINIC | Age: 45
End: 2025-06-17
Payer: COMMERCIAL

## 2025-07-11 ENCOUNTER — APPOINTMENT (OUTPATIENT)
Dept: URBAN - METROPOLITAN AREA CLINIC 252 | Age: 45
Setting detail: DERMATOLOGY
End: 2025-07-11

## 2025-07-11 VITALS — WEIGHT: 110 LBS | HEIGHT: 63 IN

## 2025-07-11 DIAGNOSIS — L40.0 PSORIASIS VULGARIS: ICD-10-CM

## 2025-07-11 DIAGNOSIS — L81.0 POSTINFLAMMATORY HYPERPIGMENTATION: ICD-10-CM

## 2025-07-11 DIAGNOSIS — B07.0 PLANTAR WART: ICD-10-CM

## 2025-07-11 PROCEDURE — OTHER BENIGN DESTRUCTION: OTHER

## 2025-07-11 PROCEDURE — OTHER PRESCRIPTION MEDICATION MANAGEMENT: OTHER

## 2025-07-11 PROCEDURE — OTHER PRESCRIPTION: OTHER

## 2025-07-11 PROCEDURE — OTHER MIPS QUALITY: OTHER

## 2025-07-11 PROCEDURE — OTHER COUNSELING: OTHER

## 2025-07-11 PROCEDURE — 17110 DESTRUCT B9 LESION 1-14: CPT

## 2025-07-11 PROCEDURE — 99213 OFFICE O/P EST LOW 20 MIN: CPT | Mod: 25

## 2025-07-11 RX ORDER — CLOBETASOL PROPIONATE 0.5 MG/ML
0.05% SOLUTION TOPICAL BID
Qty: 50 | Refills: 3 | Status: ERX

## 2025-07-11 ASSESSMENT — LOCATION DETAILED DESCRIPTION DERM
LOCATION DETAILED: LEFT MEDIAL PLANTAR 5TH TOE
LOCATION DETAILED: LEFT LATERAL INFERIOR EYELID
LOCATION DETAILED: MID-OCCIPITAL SCALP

## 2025-07-11 ASSESSMENT — LOCATION ZONE DERM
LOCATION ZONE: TOE
LOCATION ZONE: EYELID
LOCATION ZONE: SCALP

## 2025-07-11 ASSESSMENT — LOCATION SIMPLE DESCRIPTION DERM
LOCATION SIMPLE: PLANTAR SURFACE OF LEFT 5TH TOE
LOCATION SIMPLE: POSTERIOR SCALP
LOCATION SIMPLE: LEFT INFERIOR EYELID